# Patient Record
Sex: FEMALE | Race: BLACK OR AFRICAN AMERICAN | Employment: FULL TIME | URBAN - METROPOLITAN AREA
[De-identification: names, ages, dates, MRNs, and addresses within clinical notes are randomized per-mention and may not be internally consistent; named-entity substitution may affect disease eponyms.]

---

## 2017-09-15 ENCOUNTER — OFFICE VISIT (OUTPATIENT)
Dept: OPHTHALMOLOGY | Facility: CLINIC | Age: 47
End: 2017-09-15
Attending: OPHTHALMOLOGY
Payer: COMMERCIAL

## 2017-09-15 DIAGNOSIS — H50.30 INTERMITTENT EXOTROPIA, MONOCULAR: Primary | ICD-10-CM

## 2017-09-15 PROCEDURE — 92060 SENSORIMOTOR EXAMINATION: CPT | Mod: ZF | Performed by: OPHTHALMOLOGY

## 2017-09-15 PROCEDURE — 99213 OFFICE O/P EST LOW 20 MIN: CPT | Mod: 25,ZF

## 2017-09-15 ASSESSMENT — VISUAL ACUITY
METHOD: SNELLEN - LINEAR
OS_CC: 20/20
OD_CC: 20/20
OS_CC: J1+
OS_CC+: -
OD_CC: J1+
CORRECTION_TYPE: GLASSES

## 2017-09-15 ASSESSMENT — REFRACTION_WEARINGRX
OS_CYLINDER: +1.25
OD_SPHERE: -3.25
OD_CYLINDER: +1.00
OS_SPHERE: -4.25
SPECS_TYPE: SVL
OD_AXIS: 175
OD_ADD: +2.00
OS_ADD: +2.00
OS_AXIS: 015

## 2017-09-15 ASSESSMENT — REFRACTION
OS_CYLINDER: +1.00
OS_SPHERE: -4.00
OD_SPHERE: -3.50
OD_CYLINDER: +1.00
OS_AXIS: 010
OD_AXIS: 180

## 2017-09-15 ASSESSMENT — CONF VISUAL FIELD
OS_NORMAL: 1
METHOD: COUNTING FINGERS
OD_NORMAL: 1

## 2017-09-15 ASSESSMENT — EXTERNAL EXAM - RIGHT EYE: OD_EXAM: NORMAL

## 2017-09-15 ASSESSMENT — CUP TO DISC RATIO
OD_RATIO: 0.45
OS_RATIO: 0.5

## 2017-09-15 ASSESSMENT — SLIT LAMP EXAM - LIDS
COMMENTS: NORMAL
COMMENTS: NORMAL

## 2017-09-15 ASSESSMENT — EXTERNAL EXAM - LEFT EYE: OS_EXAM: NORMAL

## 2017-09-15 ASSESSMENT — TONOMETRY
OD_IOP_MMHG: 19
IOP_METHOD: ICARE - KS
OS_IOP_MMHG: 19

## 2017-09-15 NOTE — PROGRESS NOTES
"Chief Complaints and History of Present Illnesses   Patient presents with     Exotropia Evaluation     Referred by Dr. Lin in [place] (private ophtho clinic). RXT first noticed since 2nd grade, no trauma. First started wearing glasses at age 10, no VA concerns d/n, WGFT. No diplopia. No AHP. No prior treatment. Father has straismus - no sx.    Review of systems for the eyes was negative other than the pertinent positives and negatives noted in the HPI.  History is obtained from the patient    Referring provider: Cady Lin     Primary care: No primary care provider on file.   Assessment   Bailey Amin is a 47 year old female who presents with:       ICD-10-CM    1. Intermittent exotropia, monocular H50.30 Sensorimotor     Dixie-Operative Worksheet         Plan  I recommend eye muscle surgery. Today with Bailey, I reviewed the indications, risks, benefits, and alternatives of eye muscle surgery including, but not limited to, failure obtain the desired ocular alignment (\"over\" or \"under\" correction) and need for additional surgery. I further explained that surgery alone would not necessarily fully \"cure\" Bailey's strabismus or resolve/prevent the need for refractive corretion.  Rather, I emphasized that regular follow-up to monitor and optimize her vision would be necessary. We also discussed the risks of surgical injury, bleeding, and infection which may necessitate further medical or surgical treatment and which may result in diplopia, loss of vision, blindness, or loss of the eye(s) in less than 1% of cases and the remote possibility of permanent damage to any organ system or death with the use of general anesthesia.  I explained that we would hide visible scars as much as possible in natural creases but that every patient heals and pigments differently resulting in a variable degree of scarring to the eyes or surrounding facial structures after surgery.  I provided multiple opportunities for questions, " "answered all questions to the best of my ability, and confirmed that my answers and my discussion were understood.  Likely [facility]     Further details of the management plan can be found in the \"Patient Instructions\" section which was printed and given to the patient at checkout.  Data Unavailable   Attending Physician Attestation:  Complete documentation of historical and exam elements from today's encounter can be found in the full encounter summary report (not reduplicated in this progress note).  I personally obtained the chief complaint(s) and history of present illness.  I confirmed and edited as necessary the review of systems, past medical/surgical history, family history, social history, and examination findings as documented by others; and I examined the patient myself.  I personally reviewed the relevant tests, images, and reports as documented above.  I formulated and edited as necessary the assessment and plan and discussed the findings and management plan with the patient and family. - Marianna Narayan MD 9/15/2017 10:02 AM       "

## 2017-09-15 NOTE — MR AVS SNAPSHOT
After Visit Summary   9/15/2017    Bailey Amin    MRN: [id number]           Patient Information     Date Of Birth          1970        Visit Information        Provider Department      9/15/2017 7:30 AM Marianna Narayan MD [facility] Peds Eye General        Today's Diagnoses     Intermittent exotropia, monocular    -  1       Follow-ups after your visit        Your next 10 appointments already scheduled     2017 11:30 AM CST   Return Visit with [facility] EYE ORTHOPTICS   [facility] Peds Eye General ([facility])    [address]  [place] [address] [place] [id number] [phone]            2017 12:55 PM CST   Post-Op with Marianna Narayan MD   [facility] Peds Eye General ([facility])    [address]  [place] [address] [place] [id number] [phone]              Who to contact     Please call your clinic at [phone] to:    Ask questions about your health    Make or cancel appointments    Discuss your medicines    Learn about your test results    Speak to your doctor   If you have compliments or concerns about an experience at your clinic, or if you wish to file a complaint, please contact [facility] Patient Relations at [phone] or email us at [email or web address]         Additional Information About Your Visit        MyChart Information     Mak[employer] is an electronic gateway that provides easy, online access to your medical records. With Makani Power, you can request a clinic appointment, read your test results, renew a prescription or communicate with your care team.     To sign up for [employer] Med[employer] visit the website at [email or web address]   You will be asked to enter the access code listed below, as well as some personal information. Please follow the directions to create your username and password.     Your access code is: [email or web address]  Expires: 2017 10:58 AM     Your access code will  in  90 days. If you need help or a new code, please contact your [facility] or call [phone] for assistance.        Care EveryWhere ID     This is your Care EveryWhere ID. This could be used by other organizations to access your [facility] medical records  [id number]         Blood Pressure from Last 3 Encounters:   No data found for BP    Weight from Last 3 Encounters:   No data found for Wt              We Performed the Following     Dixie-Operative Worksheet     Sensorimotor        Primary Care Provider Office Phone # Fax #    Alexis De Luna [phone]       [address] [address]        Equal Access to Services     [facility] : Hadii aad ku hadasho Soomaali, waaxda luqadaha, qaybta kaalmada adeegyada, katrina arguelles . So [facility] [phone].    ATENCIÓN: Si habla español, tiene a [place] disposición servicios gratuitos de asistencia lingüística. Llame al [phone].    We comply with applicable federal civil rights laws and [place] laws. We do not discriminate on the basis of race, color, national origin, age, disability sex, sexual orientation or gender identity.            Thank you!     Thank you for choosing [facility] EYE GENERAL  for your care. Our goal is always to provide you with excellent care. Hearing back from our patients is one way we can continue to improve our services. Please take a few minutes to complete the written survey that you may receive in the mail after your visit with us. Thank you!             Your Updated Medication List - Protect others around you: Learn how to safely use, store and throw away your medicines at www.disposemymeds.org.      Notice  As of 9/15/2017 10:58 AM    You have not been prescribed any medications.

## 2017-09-15 NOTE — LETTER
"September 15, 2017    Cady Lin, OD  [facility]  [address]   [place] [address]       RE:  MRN:  : Bailey Amin  [id number]  1970     Dear Dr. Lin:    It was my pleasure to examine Bailey Amin on 9/15/2017 at the [facility] at the [facility]. Please find my assessment and recommendations below. I have also attached the findings from today's examination to the end of this note for your records.    Chief Complaints and History of Present Illnesses   Patient presents with     Exotropia Evaluation     Referred by Dr. Lin in [place] (private ophtho clinic). RXT first noticed since 2nd grade, no trauma. First started wearing glasses at age 10, no VA concerns d/n, WGFT. No diplopia. No AHP. No prior treatment. Father has strabismus - no sx.    Review of systems for the eyes was negative other than the pertinent positives and negatives noted in the HPI.  History is obtained from the patient    Referring provider: Cady Lin     Primary care: No primary care provider on file.   Assessment   Bailey Amin is a 47-year-old female who presents with:       ICD-10-CM    1. Intermittent exotropia, monocular H50.30 Sensorimotor     Dixie-Operative Worksheet         Plan  I recommend eye muscle surgery. Today with Bailey, I reviewed the indications, risks, benefits, and alternatives of eye muscle surgery including, but not limited to, failure obtain the desired ocular alignment (\"over\" or \"under\" correction) and need for additional surgery. I further explained that surgery alone would not necessarily fully \"cure\" Bailey's strabismus or resolve/prevent the need for refractive corretion.  Rather, I emphasized that regular follow-up to monitor and optimize her vision would be necessary. We also discussed the risks of surgical injury, bleeding, and infection which may necessitate further medical or surgical " "treatment and which may result in diplopia, loss of vision, blindness, or loss of the eye(s) in less than 1% of cases and the remote possibility of permanent damage to any organ system or death with the use of general anesthesia.  I explained that we would hide visible scars as much as possible in natural creases but that every patient heals and pigments differently resulting in a variable degree of scarring to the eyes or surrounding facial structures after surgery.  I provided multiple opportunities for questions, answered all questions to the best of my ability, and confirmed that my answers and my discussion were understood.  Likely [facility]     Further details of the management plan can be found in the \"Patient Instructions\" section which was printed and given to the patient at checkout.    Attending Physician Attestation:  Complete documentation of historical and exam elements from today's encounter can be found in the full encounter summary report (not reduplicated in this progress note).  I personally obtained the chief complaint(s) and history of present illness.  I confirmed and edited as necessary the review of systems, past medical/surgical history, family history, social history, and examination findings as documented by others; and I examined the patient myself.  I personally reviewed the relevant tests, images, and reports as documented above.  I formulated and edited as necessary the assessment and plan and discussed the findings and management plan with the patient and family. - Marianna Narayan MD 9/15/2017 10:02 AM         Thank you for the opportunity to participate in Bailey Amin's care. If you would like to discuss anything further, please do not hesitate to contact me.     Sincerely,    Marianna Narayan MD      GRISELDA SPARROW  [address] [address]  VIA Facsimile: [phone]     Bailey Amin  [address] [place] " [address]  VIA Mail       Base Eye Exam     Visual Acuity (Snellen - Linear)      Right Left   Dist cc 20/20 20/20 -   Near cc J1+ J1+       Correction:  Glasses      Tonometry (icare - ks, 8:20 AM)      Right Left   Pressure 19 19         Pupils      Pupils APD   Right PERRL None   Left PERRL None       Hazy irises.      Visual Fields (Counting fingers)      Left Right   Result Full Full         Neuro/Psych     Oriented x3:  Yes    Mood/Affect:  Normal      Dilation     Both eyes:  1.0% Mydriacyl, 2.5% New Synephrine @ 8:32 AM            Additional Tests     Betsey     Betsey:  Equal      Stereo     Fly:  +    Circles:  80    Fusion for short span, then suppressed RE.      [place] 4 Dot     Distance:  Fusion    Near:  Fusion            Strabismus Exam       Reading #1   (Edited by: Kaye Richardson)    Method:  Alternate cover Distance Near Near +3.00DS Near Bifocals     Correction:  cc   X(T)' 50                       0 0 -2  X(T) >50 -2 0 0    R Tilt                         XT >50 0  - -  X(T) >50 0  -2  XT >50                     L Tilt       0 0 0  X(T) >50 0 0 0            DVD:      DVD:           Nystagmus:  None       AHP:  right head tilt                Reading #2   (Edited by: Mery Grier Co)    Method:  Alternate cover km Distance Near Near +3.00DS Near Bifocals     Correction:  cc   RX(T)' 65                       0 0 +2  X(T) 55 +2 0 0    R Tilt                         XT 50 0  -tr  RX(T) 50 -tr  -2  XT 50                     L Tilt       0 0 +3  X(T) 55-60 +3 0 0            DVD:      DVD:           Nystagmus:  None       AHP:  right head tilt, I see slight chin up                 Comments     Very poor control d/n, loses with one cover.  [place] for me at dist (2nd exam), breaks easily at near, recovers prior to blink.   No diplopia noted d/n with up to 50 BI RE - suppresses            Slit Lamp and Fundus Exam     External Exam      Right Left    External Normal Normal      Slit Lamp Exam       Right Left    Lids/Lashes Normal Normal    Conjunctiva/Sclera White and quiet White and quiet    Cornea Clear Clear    Anterior Chamber Deep and quiet Deep and quiet    Iris Round and reactive Round and reactive    Lens Clear Clear    Vitreous Normal Normal      Fundus Exam      Right Left    Disc Normal Normal    C/D Ratio 0.45 0.5    Macula Normal Normal    Vessels Normal Normal    Periphery Normal Normal            Refraction     Wearing Rx      Sphere Cylinder Axis Add   Right -3.25 +1.00 175 +2.00   Left -4.25 +1.25 015 +2.00       Type:  SVL      Cycloplegic Refraction      Sphere Cylinder Axis Dist   Right -3.50 +1.00 180 20/20   Left -4.00 +1.00 010 20/20

## 2017-09-15 NOTE — NURSING NOTE
Chief Complaint   Patient presents with     Exotropia Evaluation     Referred by Dr. Lin in [place] (private ophtho clinic). RXT first noticed since 2nd grade, no trauma. First started wearing glasses at age 10, no VA concerns d/n, WGFT. No diplopia. No AHP. No prior treatment. Father has straismus - no sx.      HPI    Symptoms:           Do you have eye pain now?:  No

## 2017-09-21 PROBLEM — H50.30 INTERMITTENT EXOTROPIA, MONOCULAR: Status: ACTIVE | Noted: 2017-09-21

## 2017-11-12 ENCOUNTER — ANESTHESIA EVENT (OUTPATIENT)
Dept: SURGERY | Facility: CLINIC | Age: 47
End: 2017-11-12
Payer: COMMERCIAL

## 2017-11-14 ASSESSMENT — ENCOUNTER SYMPTOMS: SEIZURES: 0

## 2017-11-15 ENCOUNTER — ANESTHESIA (OUTPATIENT)
Dept: SURGERY | Facility: CLINIC | Age: 47
End: 2017-11-15
Payer: COMMERCIAL

## 2017-11-15 ENCOUNTER — HOSPITAL ENCOUNTER (OUTPATIENT)
Facility: CLINIC | Age: 47
Discharge: HOME | End: 2017-11-15
Attending: OPHTHALMOLOGY | Admitting: OPHTHALMOLOGY
Payer: COMMERCIAL

## 2017-11-15 ENCOUNTER — OFFICE VISIT (OUTPATIENT)
Dept: OPHTHALMOLOGY | Facility: CLINIC | Age: 47
End: 2017-11-15
Attending: OPHTHALMOLOGY
Payer: COMMERCIAL

## 2017-11-15 VITALS
HEIGHT: 65 IN | OXYGEN SATURATION: 99 % | DIASTOLIC BLOOD PRESSURE: 65 MMHG | HEART RATE: 79 BPM | WEIGHT: 120.37 LBS | TEMPERATURE: 98.4 F | BODY MASS INDEX: 20.06 KG/M2 | SYSTOLIC BLOOD PRESSURE: 95 MMHG | RESPIRATION RATE: 20 BRPM

## 2017-11-15 DIAGNOSIS — Z98.890 POSTOPERATIVE EYE STATE: Primary | ICD-10-CM

## 2017-11-15 DIAGNOSIS — H50.30 INTERMITTENT EXOTROPIA, MONOCULAR: Primary | ICD-10-CM

## 2017-11-15 LAB
GLUCOSE BLDC GLUCOMTR-MCNC: 75 MG/DL (ref 70–99)
HCG UR QL: NEGATIVE

## 2017-11-15 PROCEDURE — 36000059 ZZH SURGERY LEVEL 3 EA 15 ADDTL MIN UMMC: Performed by: OPHTHALMOLOGY

## 2017-11-15 PROCEDURE — 37000009 ZZH ANESTHESIA TECHNICAL FEE, EACH ADDTL 15 MIN: Performed by: OPHTHALMOLOGY

## 2017-11-15 PROCEDURE — 36000057 ZZH SURGERY LEVEL 3 1ST 30 MIN - UMMC: Performed by: OPHTHALMOLOGY

## 2017-11-15 PROCEDURE — 25000125 ZZHC RX 250: Performed by: ANESTHESIOLOGY

## 2017-11-15 PROCEDURE — 25000125 ZZHC RX 250: Performed by: OPHTHALMOLOGY

## 2017-11-15 PROCEDURE — 25000566 ZZH SEVOFLURANE, EA 15 MIN: Performed by: OPHTHALMOLOGY

## 2017-11-15 PROCEDURE — 25000132 ZZH RX MED GY IP 250 OP 250 PS 637: Performed by: ANESTHESIOLOGY

## 2017-11-15 PROCEDURE — 25000128 H RX IP 250 OP 636: Performed by: ANESTHESIOLOGY

## 2017-11-15 PROCEDURE — 82962 GLUCOSE BLOOD TEST: CPT

## 2017-11-15 PROCEDURE — 71000012 ZZH RECOVERY PHASE 1 LEVEL 1 FIRST HR: Performed by: OPHTHALMOLOGY

## 2017-11-15 PROCEDURE — 71000027 ZZH RECOVERY PHASE 2 EACH 15 MINS: Performed by: OPHTHALMOLOGY

## 2017-11-15 PROCEDURE — 81025 URINE PREGNANCY TEST: CPT | Performed by: ANESTHESIOLOGY

## 2017-11-15 PROCEDURE — 92060 SENSORIMOTOR EXAMINATION: CPT | Mod: ZF

## 2017-11-15 PROCEDURE — 25000128 H RX IP 250 OP 636: Performed by: NURSE ANESTHETIST, CERTIFIED REGISTERED

## 2017-11-15 PROCEDURE — 99213 OFFICE O/P EST LOW 20 MIN: CPT | Mod: 25,ZF

## 2017-11-15 PROCEDURE — 25000125 ZZHC RX 250: Performed by: NURSE ANESTHETIST, CERTIFIED REGISTERED

## 2017-11-15 PROCEDURE — 25000132 ZZH RX MED GY IP 250 OP 250 PS 637: Performed by: OPHTHALMOLOGY

## 2017-11-15 PROCEDURE — 37000008 ZZH ANESTHESIA TECHNICAL FEE, 1ST 30 MIN: Performed by: OPHTHALMOLOGY

## 2017-11-15 PROCEDURE — 40000170 ZZH STATISTIC PRE-PROCEDURE ASSESSMENT II: Performed by: OPHTHALMOLOGY

## 2017-11-15 PROCEDURE — 27210794 ZZH OR GENERAL SUPPLY STERILE: Performed by: OPHTHALMOLOGY

## 2017-11-15 PROCEDURE — 71000013 ZZH RECOVERY PHASE 1 LEVEL 1 EA ADDTL HR: Performed by: OPHTHALMOLOGY

## 2017-11-15 RX ORDER — KETOROLAC TROMETHAMINE 30 MG/ML
INJECTION, SOLUTION INTRAMUSCULAR; INTRAVENOUS PRN
Status: DISCONTINUED | OUTPATIENT
Start: 2017-11-15 | End: 2017-11-15

## 2017-11-15 RX ORDER — ONDANSETRON 4 MG/1
4 TABLET, ORALLY DISINTEGRATING ORAL EVERY 30 MIN PRN
Status: DISCONTINUED | OUTPATIENT
Start: 2017-11-15 | End: 2017-11-15 | Stop reason: HOSPADM

## 2017-11-15 RX ORDER — SODIUM CHLORIDE, SODIUM LACTATE, POTASSIUM CHLORIDE, CALCIUM CHLORIDE 600; 310; 30; 20 MG/100ML; MG/100ML; MG/100ML; MG/100ML
500 INJECTION, SOLUTION INTRAVENOUS CONTINUOUS
Status: DISCONTINUED | OUTPATIENT
Start: 2017-11-15 | End: 2017-11-15 | Stop reason: HOSPADM

## 2017-11-15 RX ORDER — ONDANSETRON 2 MG/ML
INJECTION INTRAMUSCULAR; INTRAVENOUS PRN
Status: DISCONTINUED | OUTPATIENT
Start: 2017-11-15 | End: 2017-11-15

## 2017-11-15 RX ORDER — OXYCODONE HYDROCHLORIDE 5 MG/1
5 TABLET ORAL
Status: COMPLETED | OUTPATIENT
Start: 2017-11-15 | End: 2017-11-15

## 2017-11-15 RX ORDER — OXYMETAZOLINE HYDROCHLORIDE 0.05 G/100ML
SPRAY NASAL PRN
Status: DISCONTINUED | OUTPATIENT
Start: 2017-11-15 | End: 2017-11-15 | Stop reason: HOSPADM

## 2017-11-15 RX ORDER — SODIUM CHLORIDE, SODIUM LACTATE, POTASSIUM CHLORIDE, CALCIUM CHLORIDE 600; 310; 30; 20 MG/100ML; MG/100ML; MG/100ML; MG/100ML
INJECTION, SOLUTION INTRAVENOUS CONTINUOUS
Status: DISCONTINUED | OUTPATIENT
Start: 2017-11-15 | End: 2017-11-15 | Stop reason: HOSPADM

## 2017-11-15 RX ORDER — SCOLOPAMINE TRANSDERMAL SYSTEM 1 MG/1
1 PATCH, EXTENDED RELEASE TRANSDERMAL ONCE
Status: COMPLETED | OUTPATIENT
Start: 2017-11-15 | End: 2017-11-15

## 2017-11-15 RX ORDER — MEPERIDINE HYDROCHLORIDE 25 MG/ML
12.5 INJECTION INTRAMUSCULAR; INTRAVENOUS; SUBCUTANEOUS
Status: DISCONTINUED | OUTPATIENT
Start: 2017-11-15 | End: 2017-11-15 | Stop reason: HOSPADM

## 2017-11-15 RX ORDER — BALANCED SALT SOLUTION 6.4; .75; .48; .3; 3.9; 1.7 MG/ML; MG/ML; MG/ML; MG/ML; MG/ML; MG/ML
SOLUTION OPHTHALMIC PRN
Status: DISCONTINUED | OUTPATIENT
Start: 2017-11-15 | End: 2017-11-15 | Stop reason: HOSPADM

## 2017-11-15 RX ORDER — NALOXONE HYDROCHLORIDE 0.4 MG/ML
.1-.4 INJECTION, SOLUTION INTRAMUSCULAR; INTRAVENOUS; SUBCUTANEOUS
Status: DISCONTINUED | OUTPATIENT
Start: 2017-11-15 | End: 2017-11-15 | Stop reason: HOSPADM

## 2017-11-15 RX ORDER — GLYCOPYRROLATE 0.2 MG/ML
INJECTION, SOLUTION INTRAMUSCULAR; INTRAVENOUS PRN
Status: DISCONTINUED | OUTPATIENT
Start: 2017-11-15 | End: 2017-11-15

## 2017-11-15 RX ORDER — ONDANSETRON 2 MG/ML
4 INJECTION INTRAMUSCULAR; INTRAVENOUS EVERY 30 MIN PRN
Status: DISCONTINUED | OUTPATIENT
Start: 2017-11-15 | End: 2017-11-15 | Stop reason: HOSPADM

## 2017-11-15 RX ORDER — FENTANYL CITRATE 50 UG/ML
25-50 INJECTION, SOLUTION INTRAMUSCULAR; INTRAVENOUS
Status: DISCONTINUED | OUTPATIENT
Start: 2017-11-15 | End: 2017-11-15 | Stop reason: HOSPADM

## 2017-11-15 RX ORDER — LIDOCAINE HYDROCHLORIDE 20 MG/ML
INJECTION, SOLUTION INFILTRATION; PERINEURAL PRN
Status: DISCONTINUED | OUTPATIENT
Start: 2017-11-15 | End: 2017-11-15

## 2017-11-15 RX ORDER — HYDROMORPHONE HYDROCHLORIDE 1 MG/ML
.3-.5 INJECTION, SOLUTION INTRAMUSCULAR; INTRAVENOUS; SUBCUTANEOUS EVERY 10 MIN PRN
Status: DISCONTINUED | OUTPATIENT
Start: 2017-11-15 | End: 2017-11-15 | Stop reason: HOSPADM

## 2017-11-15 RX ORDER — SODIUM CHLORIDE, SODIUM LACTATE, POTASSIUM CHLORIDE, CALCIUM CHLORIDE 600; 310; 30; 20 MG/100ML; MG/100ML; MG/100ML; MG/100ML
INJECTION, SOLUTION INTRAVENOUS CONTINUOUS PRN
Status: DISCONTINUED | OUTPATIENT
Start: 2017-11-15 | End: 2017-11-15

## 2017-11-15 RX ORDER — ACETAMINOPHEN 500 MG
1000 TABLET ORAL ONCE
Status: COMPLETED | OUTPATIENT
Start: 2017-11-15 | End: 2017-11-15

## 2017-11-15 RX ORDER — PROPOFOL 10 MG/ML
INJECTION, EMULSION INTRAVENOUS PRN
Status: DISCONTINUED | OUTPATIENT
Start: 2017-11-15 | End: 2017-11-15

## 2017-11-15 RX ORDER — OXYCODONE HYDROCHLORIDE 5 MG/1
5 TABLET ORAL EVERY 6 HOURS PRN
Qty: 12 TABLET | Refills: 0 | Status: SHIPPED | OUTPATIENT
Start: 2017-11-15 | End: 2018-04-20

## 2017-11-15 RX ORDER — FENTANYL CITRATE 50 UG/ML
INJECTION, SOLUTION INTRAMUSCULAR; INTRAVENOUS PRN
Status: DISCONTINUED | OUTPATIENT
Start: 2017-11-15 | End: 2017-11-15

## 2017-11-15 RX ORDER — PHENYLEPHRINE HCL IN 0.9% NACL 1 MG/10 ML
SYRINGE (ML) INTRAVENOUS PRN
Status: DISCONTINUED | OUTPATIENT
Start: 2017-11-15 | End: 2017-11-15

## 2017-11-15 RX ADMIN — FENTANYL CITRATE 50 MCG: 50 INJECTION, SOLUTION INTRAMUSCULAR; INTRAVENOUS at 14:07

## 2017-11-15 RX ADMIN — ONDANSETRON 4 MG: 2 INJECTION INTRAMUSCULAR; INTRAVENOUS at 15:18

## 2017-11-15 RX ADMIN — KETOROLAC TROMETHAMINE 30 MG: 30 INJECTION, SOLUTION INTRAMUSCULAR at 15:19

## 2017-11-15 RX ADMIN — SCOLOPAMINE TRANSDERMAL SYSTEM 1 PATCH: 1 PATCH, EXTENDED RELEASE TRANSDERMAL at 13:30

## 2017-11-15 RX ADMIN — SODIUM CHLORIDE, POTASSIUM CHLORIDE, SODIUM LACTATE AND CALCIUM CHLORIDE: 600; 310; 30; 20 INJECTION, SOLUTION INTRAVENOUS at 13:58

## 2017-11-15 RX ADMIN — HYDROMORPHONE HYDROCHLORIDE 0.3 MG: 1 INJECTION, SOLUTION INTRAMUSCULAR; INTRAVENOUS; SUBCUTANEOUS at 16:11

## 2017-11-15 RX ADMIN — KETOROLAC TROMETHAMINE 30 MG: 30 INJECTION, SOLUTION INTRAMUSCULAR at 15:20

## 2017-11-15 RX ADMIN — ACETAMINOPHEN 1000 MG: 500 TABLET ORAL at 13:30

## 2017-11-15 RX ADMIN — HYDROMORPHONE HYDROCHLORIDE 0.2 MG: 1 INJECTION, SOLUTION INTRAMUSCULAR; INTRAVENOUS; SUBCUTANEOUS at 16:01

## 2017-11-15 RX ADMIN — FENTANYL CITRATE 25 MCG: 50 INJECTION, SOLUTION INTRAMUSCULAR; INTRAVENOUS at 14:47

## 2017-11-15 RX ADMIN — LIDOCAINE HYDROCHLORIDE 50 MG: 20 INJECTION, SOLUTION INFILTRATION; PERINEURAL at 14:07

## 2017-11-15 RX ADMIN — FENTANYL CITRATE 25 MCG: 50 INJECTION, SOLUTION INTRAMUSCULAR; INTRAVENOUS at 14:55

## 2017-11-15 RX ADMIN — OXYCODONE HYDROCHLORIDE 5 MG: 5 TABLET ORAL at 16:37

## 2017-11-15 RX ADMIN — FENTANYL CITRATE 50 MCG: 50 INJECTION, SOLUTION INTRAMUSCULAR; INTRAVENOUS at 15:27

## 2017-11-15 RX ADMIN — Medication 0.2 MG: at 14:23

## 2017-11-15 RX ADMIN — Medication 50 MCG: at 15:01

## 2017-11-15 RX ADMIN — Medication 100 MCG: at 14:21

## 2017-11-15 RX ADMIN — PROPOFOL 150 MG: 10 INJECTION, EMULSION INTRAVENOUS at 14:07

## 2017-11-15 ASSESSMENT — REFRACTION_WEARINGRX
OS_CYLINDER: +1.25
OS_SPHERE: -4.25
OS_ADD: +2.00
OD_ADD: +2.00
OS_AXIS: 015
OD_AXIS: 175
OD_CYLINDER: +1.00
SPECS_TYPE: SVL
OD_SPHERE: -3.25

## 2017-11-15 ASSESSMENT — ENCOUNTER SYMPTOMS: STRIDOR: 0

## 2017-11-15 ASSESSMENT — VISUAL ACUITY
OD_CC: 20/20
OS_CC: 20/20
METHOD: SNELLEN - LINEAR

## 2017-11-15 NOTE — OR NURSING
Instructions reviewed, with  Pt and pt's mother  Pt  has taken 1 oxycodone recently  for eye discomfort  Is tolerating food and liquids without nausea.  Dr Del Castillo notified pt is doing well, anesthesia sign out requested.

## 2017-11-15 NOTE — NURSING NOTE
Chief Complaint   Patient presents with     Exotropia Follow Up     RSE. XT stable, vision is good.      HPI    Affected eye(s):  Right   Symptoms:     Wandering eye      Frequency:  Intermittent       Do you have eye pain now?:  No

## 2017-11-15 NOTE — MR AVS SNAPSHOT
After Visit Summary   11/15/2017    Bailey Amin    MRN: [id number]           Patient Information     Date Of Birth          1970        Visit Information        Provider Department      11/15/2017 11:45 AM [facility] EYE ORTHOPTICS [facility] Peds Eye General        Today's Diagnoses     Intermittent exotropia, monocular    -  1       Follow-ups after your visit        Your next 10 appointments already scheduled     2018  3:00 PM CST   Return Pediatric Visit with Marianna Narayan MD   [facility] Peds Eye General ([facility])    [address]  [address] [id number] [phone]              Who to contact     Please call your clinic at [phone] to:    Ask questions about your health    Make or cancel appointments    Discuss your medicines    Learn about your test results    Speak to your doctor   If you have compliments or concerns about an experience at your clinic, or if you wish to file a complaint, please contact [facility] Patient Relations at [phone] or email us at [email or web address]         Additional Information About Your Visit        MyChart Information     BigDeal is an electronic gateway that provides easy, online access to your medical records. With BigDeal, you can request a clinic appointment, read your test results, renew a prescription or communicate with your care team.     To sign up for BigDeal visit the website at [email or web address]   You will be asked to enter the access code listed below, as well as some personal information. Please follow the directions to create your username and password.     Your access code is: [email or web address]  Expires: 2017  9:58 AM     Your access code will  in 90 days. If you need help or a new code, please contact your [facility] or call [phone] for assistance.        Care EveryWhere ID     This is your Care  EveryWhere ID. This could be used by other organizations to access your [facility] medical records  [id number]         Blood Pressure from Last 3 Encounters:   11/15/17 95/65    Weight from Last 3 Encounters:   11/15/17 54.6 kg (120 lb 5.9 oz)              We Performed the Following     Sensorimotor        Primary Care Provider Office Phone # Fax #    Alexis De Luna [phone]       [place] [address]        Equal Access to Services     SAHARA CLARK : Hadii aad ku hadasho Soomaali, waaxda luqadaha, qaybta kaalmada adeegyada, waxay idiin hayaan adeeg khpam lapina . So [facility] [phone].    ATENCIÓN: Si habla español, tiene a [place] disposición servicios gratuitos de asistencia lingüística. [facility] [phone].    We comply with applicable federal civil rights laws and [place] laws. We do not discriminate on the basis of race, color, national origin, age, disability, sex, sexual orientation, or gender identity.            Thank you!     Thank you for choosing [facility] EYE GENERAL  for your care. Our goal is always to provide you with excellent care. Hearing back from our patients is one way we can continue to improve our services. Please take a few minutes to complete the written survey that you may receive in the mail after your visit with us. Thank you!             Your Updated Medication List - Protect others around you: Learn how to safely use, store and throw away your medicines at www.disposemymeds.org.          This list is accurate as of: 11/15/17 12:08 PM.  Always use your most recent med list.                   Brand Name Dispense Instructions for use Diagnosis    HYDROXYZINE PAMOATE PO      Take 25 mg by mouth nightly as needed        oxyCODONE IR 5 MG tablet    ROXICODONE    12 tablet    Take 1 tablet (5 mg) by mouth every 6 hours as needed for pain Maximum 4 tablet(s) per day    Postoperative eye state

## 2017-11-15 NOTE — ANESTHESIA CARE TRANSFER NOTE
Patient: Bailey Amin    Procedure(s):  Bilateral Strabismus Repair - Wound Class: I-Clean    Diagnosis: Strabismus  Diagnosis Additional Information: No value filed.    Anesthesia Type:   General, LMA     Note:  Airway :Face Mask  Patient transferred to:PACU  Comments: Patient is easily arousable, encouraged to keep eyes closed. VSS, exchanging O2 per FM, in no distress; normothermic. IV is patent. Report to RN.Handoff Report: Identifed the Patient, Identified the Reponsible Provider, Reviewed the pertinent medical history, Discussed the surgical course, Reviewed Intra-OP anesthesia mangement and issues during anesthesia, Set expectations for post-procedure period and Allowed opportunity for questions and acknowledgement of understanding      Vitals: (Last set prior to Anesthesia Care Transfer)    CRNA VITALS  11/15/2017 1459 - 11/15/2017 1543      11/15/2017             NIBP: 105/72    Pulse: 92    Temp: 36.6  C (97.9  F)    SpO2: 100 %    Resp Rate (observed): 9    EKG: Sinus rhythm                Electronically Signed By: DASHA Ny CRNA  November 15, 2017  3:43 PM

## 2017-11-15 NOTE — IP AVS SNAPSHOT
MRN:[id number]                      After Visit Summary   11/15/2017    Bailey Amin    MRN: [id number]           Thank you!     Thank you for choosing [facility] for your care. Our goal is always to provide you with excellent care. Hearing back from our patients is one way we can continue to improve our services. Please take a few minutes to complete the written survey that you may receive in the mail after you visit with us. Thank you!        Patient Information     Date Of Birth          1970        About your hospital stay     You were admitted on:  November 15, 2017 You last received care in the:  [facility] PACU    You were discharged on:  November 15, 2017       Who to Call     For medical emergencies, please call 911.  For non-urgent questions about your medical care, please call your primary care provider or clinic, [phone]  For questions related to your surgery, please call your surgery clinic        Attending Provider     Provider Marianna Harrison MD Ophthalmology       Primary Care Provider Office Phone # Fax #    Alexis De Luna [phone]      Your next 10 appointments already scheduled     Nov 17, 2017 11:00 AM CST   Post-Op with Marianna Narayan MD   [facility] ([facility])    [address] [address] [phone]              Further instructions from your care team       Instructions for after your eye surgery:  Apply ice packs to eyes on and off as tolerated for 2 days.    Oxycodone 5mg has been prescribed. You do not need to fill this prescription but it is provided for breakthrough pain. Take 1 pill every six hours as needed for pain.     Acetaminophen (Tylenol) and NSAIDs (Motrin, Ibuprofen, Advil, Naproxen) may be given per the dosing instructions on the label for pain every 6 hours.  I recommend alternating these two types of medicine every 3 hours so that Bailey  receives one of them for pain control every 3 hours.  (For example: acetaminophen - wait 3 hours - ibuprofen - wait 3 hours - acetaminophen - wait 3 hours - ibuprofen - etc.)    Avoid all eye pressure or trauma. No eye rubbing, straining, or athletics for 1 week.     No water in the face (including bathing) for 1 week. Instill your antibiotic eye drops after bathing for the first week. No swimming for 2 weeks.    Same-Day Surgery   Adult Discharge Orders & Instructions     For 24 hours after surgery:  1. Get plenty of rest.  A responsible adult must stay with you for at least 24 hours after you leave the hospital.   2. Pain medication can slow your reflexes. Do not drive or use heavy equipment.  If you have weakness or tingling, don't drive or use heavy equipment until this feeling goes away.  3. Mixing alcohol and pain medication can cause dizziness and slow your breathing. It can even be fatal. Do not drink alcohol while taking pain medication.  4. Avoid strenuous or risky activities.  Ask for help when climbing stairs.   5. You may feel lightheaded.  If so, sit for a few minutes before standing.  Have someone help you get up.   6. If you have nausea (feel sick to your stomach), drink only clear liquids such as apple juice, ginger ale, broth or 7-Up.  Rest may also help.  Be sure to drink enough fluids.  Move to a regular diet as you feel able. Take pain medications with a small amount of solid food, such as toast or crackers, to avoid nausea.   7. A slight fever is normal. Call the doctor if your fever is over 100 F (37.7 C) (taken under the tongue) or lasts longer than 24 hours.  8. You may have a dry mouth, muscle aches, trouble sleeping or a sore throat.  These symptoms should go away after 24 hours.  9. Do not make important or legal decisions.   Pain Management:      1. Take pain medication (if prescribed) for pain as directed by your physician.        2. WARNING: If the pain medication you have been  prescribed contains Tylenol  (acetaminophen), DO NOT take additional doses of Tylenol (acetaminophen).     Call your doctor for any of the followin.  Signs of infection (fever, growing tenderness at the surgery site, severe pain, a large amount of drainage or bleeding, foul-smelling drainage, redness, swelling).    2.  It has been over 8 to 10 hours since surgery and you are still not able to urinate (pee).    3.  Headache for over 24 hours.    4.  Numbness, tingling or weakness the day after surgery (if you had spinal anesthesia).  To contact a doctor, call _____________________________________ or:      [phone] and ask for the Resident On Call for:          __________________________________________ (answered 24 hours a day)      Emergency Department:  [facility]: [phone]  [facility]: [phone]               Rev. 10/2014   Scopolamine Patch  (Absorbed through the skin)    The Scopolamine Patch prevents nausea and vomiting caused by motion sickness or anesthesia and surgery in adults.    Brand Name(s): Transderm Scop, Transderm-Scope  There may be other brand names for this medicine.    When This Medicine Should Not Be Used:  You should not use this medicine if you have had an allergic reaction to scopolamine, or if you have narrow angle glaucoma.    How to Use This Medicine:    Your doctor will tell you how many patches to use, where to apply them, and how often to apply them.     Do not use more patches or apply them more often than your doctor tells you to.    This medicine comes with patient instructions. Read and follow these instructions carefully. Ask your doctor or pharmacist if you have any questions.    To prevent motion sickness, apply the patch at least 4 hours before you need it.    Wash and dry your hands thoroughly before applying the patch.    Leave the patch in its sealed wrapper until you are ready to put it on. Tear the wrapper open  carefully. NEVER CUT the wrapper or the patch with scissors. Do not use any patch that has been cut by accident.    Take the liner off the sticky side before applying.    Apply the patch to dry, hairless skin behind the ear.    If the patch is loose or falls off, apply a new patch at a different place behind the ear.    After you take off the patch, wash the place where the patch was and your hands thoroughly.    Only one patch should be used at any time.    If a dose is missed:  If you forget to wear or change a patch, put one on as soon as you can. If it is almost time to put on your next patch, wait until then to apply a new patch and skip the one you missed. Do not apply extra patches to make up for a missed dose.    How to Store and Dispose of This Medicine:    Store the patches at room temperature in a closed container, away from heat, moisture and direct light.    Fold the used patch in half with the sticky sides together. Throw any used patch away so that children or pets cannot get to it. You will also need to throw away old patches after the expiration date has passed.    Keep all medicine away from children and never share your medicine with anyone.    Ask your doctor or pharmacist before using any other medicine, including over-the-counter medicines, vitamins, and herbal products.  Tell your doctor if you are using any medicines that make you sleepy. These include sleeping pills, cold and allergy medicine, narcotic pain relievers and sedatives.     Tell your doctor if you are using any medicine that make you sleepy. These include sleeping pills, eliel and allergy medicine, narcotic pain relievers and sedatives.    Do not drink alcohol while you are using this medicine.     Warnings While Using This Medicine:    Make sure your doctor knows if you are pregnant or breastfeeding or if you have glaucoma, prostate problems, trouble urinating, blocked bowels, liver disease, kidney disease or a history of  seizures or mental illness.    This medicine can cause blurring of vision and other vision problems if it comes in contact with the eyes. This medicine may also cause problems with urination. If any of these reactions occur, remove the patch and call your doctor right away.    This medicine may make you dizzy or drowsy. Avoid driving, using machines, or doing anything else that could be dangerous if you are not alert. If you plan to participate in underwater sports, this medicine may cause disorienting effects. If this is a concern for you, talk with your doctor.    This medicine may make you sweat less and cause your body to get too hot. Be careful in hot weather, when you are exercising or if using sauna or whirlpool.    Make sure any doctor or dentist who treats you knows that you are using this medicine. This medicine may affect the results of certain medical tests.    Skin burns have been reported at the patch site in several patients wearing an aluminized transdermal system during a magnetic resonance imaging scan (MRI). Because Transderm Scop contains aluminum, it is recommended to remove the system before undergoing an MRI.    Call your doctor right away if you notice any of these side effects:    Allergic reaction: Itching or hives, swelling in your face or hands, swelling or tingling in your mouth or throat, chest tightness, trouble breathing    Blurred vision    Confusion or memory loss    Fast, slow or uneven heartbeat    Lightheadedness, dizziness, drowsiness or fainting    Seeing, hearing or feeling things that are not there    Severe eye pain    Trouble urinating    If you notice these less serious side effects, talk with your doctor:    Dry mouth    Dry, itchy or red eyes    Restlessness    Skin rash or redness    If you notice other side effects that you think are caused by this medicine, tell your doctor immediately.    Rev. 4/2014    Strabismus Repair Discharge Instructions    Dr. Marianna Narayan,  Dr. Uziel Love      Your eye doctor performed surgery to help align your eye(s). During surgery, certain eye muscles are adjusted. This helps the muscles better control how the eye moves. Often, surgery is done in addition to other treatments.   How Surgery Works  Strabismus surgery is a safe, common operation. The doctor changes the placement or length of an eye muscle. This small change can pull the eye into proper alignment. The two most common methods of surgery are:    Recession, in which a muscle is moved to a new position on the eye.    Resection, in which a small section of an eye muscle is removed.  What to Expect  It is normal to experience the following:    Eye Redness. This will resolve slowly over 2- 4 weeks.    Pink tinged tears    Swollen, painful or itchy eyes    Sensitivity to bright lights.    Trouble opening eyes for 1-2 days.    Feeling dizzy or off balance until you get used to seeing differently.  After Surgery Care    Avoid rubbing your eyes.  o You may use cool cloths to help with pain and/or itching.    Minimize direct contact with water for 1 week. Showering or bathing is allowed.  o You may use a warm, wet washcloth to remove any dried drainage from the eye. Wipe eye from inner corner to the outer corner of the eye.     No swimming for 1 week.    Use sunglasses or a hat to protect eyes from bright lights if you are light sensitive.    You may wear glasses as soon as needed.    No heavy lifting or contact sports for 1 week.     Use eye drops or eye ointment as directed to prevent infection and decrease swelling. Avoid touching surface of eye with the tube or bottle.   Suture Care  Sutures will dissolve over several weeks; they will not need to be removed.   Adjustable sutures may have been placed during surgery. You may need to stay in the recovery room for a longer period after surgery before a possible suture adjustment is performed. Once the suture is adjusted you will be sent home.    When to Call the Doctor     Eyelids are progressively getting more swollen and painful after 24 hours.    You see a dramatic change in the position of the eye over time.    Frequent or continuous vomiting.    Green or yellow drainage from the eye.    Temperature over 101 degrees.  If your child experiences worsening RSVP (Redness, Sensitivity to light, Vision, Pain), or develops fever or worsening discharge, call EITHER    [phone] (8am-4:30pm Monday-Friday)    [phone] (after hours & weekends)  and ask to speak with the Ophthalmology Resident or Fellow On-Call or return to the eye clinic or emergency room immediately.        If your child is unable to tolerate food and drink, vomits 3 times, or appears to have decreased alertness or lethargy, return to the emergency room immediately. These can be signs of delayed gastrointestinal wake-up after anesthesia and your child may need IV fluids to prevent dehydration.    Follow Up  Follow up with your doctor tomorrow as scheduled  Rev. 3/2014            Return for follow-up with Dr. Narayan as scheduled.  If you do not have an appointment already, please call to arrange follow-up     [place]: Leeann Goodman at [phone] or our  at [phone]        If Bailey Amin experiences worsening RSVP (Redness, Sensitivity to light, Vision, Pain), or if Bailey develops a fever (temperature greater than 100.4 F) or worsening discharge or if you have any other concerns:        call [phone] (during business hours) or [phone] (after hours & weekends) and ask to speak with the Ophthalmology Resident or Fellow On-Call   OR    return to the eye clinic or emergency room immediately.     If Bailey is unable to tolerate food and drink, vomits 3 times, or appears to have decreased alertness or lethargy, return to the emergency room immediately as these can be signs of delayed stomach wake-up after anesthesia and Bailey may need IV fluids to  "prevent dehydration.    For assistance from an :    7 AM - 6 PM on Monday - Friday, and 7 AM - 4:30 PM on Saturday & : call [phone], then select option 3.    After hours: call [phone] and ask the  for  assistance.       Pending Results     No orders found from 2017 to 2017.            Admission Information     Date & Time Provider Department Dept. Phone    11/15/2017 Marianna Narayan MD [facility] PACU [phone]      Your Vitals Were     Blood Pressure Pulse Temperature Respirations Height Weight    105/65 79 98.4  F (36.9  C) (Oral) 15 1.641 m (5' 4.6\") 54.6 kg (120 lb 5.9 oz)    Pulse Oximetry BMI (Body Mass Index)                100% 20.28 kg/m2          [employer] Information     [facility] lets you send messages to your doctor, view your test results, renew your prescriptions, schedule appointments and more. To sign up, go to [email or web address] . Click on \"Log in\" on the left side of the screen, which will take you to the Welcome page. Then click on \"Sign up Now\" on the right side of the page.     You will be asked to enter the access code listed below, as well as some personal information. Please follow the directions to create your username and password.     Your access code is: [email or web address]  Expires: 2017  9:58 AM     Your access code will  in 90 days. If you need help or a new code, please call your [facility] clinic or [phone].        Care EveryWhere ID     This is your Care EveryWhere ID. This could be used by other organizations to access your [facility] medical records  [id number]        Equal Access to Services     SAHARA CLARK : Wm Wing, jose hastings, layne katomi spence, katrina peraza. So [facility] [phone].    ATENCIÓN: Si habla español, tiene a [place] disposición servicios gratuitos de asistencia lingüística. Llame al [phone].    We comply with applicable federal " civil rights laws and [place] laws. We do not discriminate on the basis of race, color, national origin, age, disability, sex, sexual orientation, or gender identity.               Review of your medicines      START taking        Dose / Directions    oxyCODONE IR 5 MG tablet   Commonly known as:  ROXICODONE   Used for:  Postoperative eye state        Dose:  5 mg   Take 1 tablet (5 mg) by mouth every 6 hours as needed for pain Maximum 4 tablet(s) per day   Quantity:  12 tablet   Refills:  0         CONTINUE these medicines which have NOT CHANGED        Dose / Directions    HYDROXYZINE PAMOATE PO        Dose:  25 mg   Take 25 mg by mouth nightly as needed   Refills:  0            Where to get your medicines      Some of these will need a paper prescription and others can be bought over the counter. Ask your nurse if you have questions.     Bring a paper prescription for each of these medications     oxyCODONE IR 5 MG tablet                Protect others around you: Learn how to safely use, store and throw away your medicines at www.disposemymeds.org.             Medication List: This is a list of all your medications and when to take them. Check marks below indicate your daily home schedule. Keep this list as a reference.      Medications           Morning Afternoon Evening Bedtime As Needed    HYDROXYZINE PAMOATE PO   Take 25 mg by mouth nightly as needed                                oxyCODONE IR 5 MG tablet   Commonly known as:  ROXICODONE   Take 1 tablet (5 mg) by mouth every 6 hours as needed for pain Maximum 4 tablet(s) per day

## 2017-11-15 NOTE — IP AVS SNAPSHOT
[place] [address] [place] [address]    Phone:  [phone]                                       After Visit Summary   11/15/2017    Bailey Amin    MRN: [id number]           After Visit Summary Signature Page     I have received my discharge instructions, and my questions have been answered. I have discussed any challenges I see with this plan with the nurse or doctor.    ..........................................................................................................................................  Patient/Patient Representative Signature      ..........................................................................................................................................  Patient Representative Print Name and Relationship to Patient    ..................................................               ................................................  Date                                            Time    ..........................................................................................................................................  Reviewed by Signature/Title    ...................................................              ..............................................  Date                                                            Time

## 2017-11-15 NOTE — ANESTHESIA PREPROCEDURE EVALUATION
HPI:  Bailey Amin is a 47 year old female with a primary diagnosis of bilateral strabismus who presents for strabismus repair.    Otherwise, she  has a past medical history of ASCUS with positive high risk HPV cervical; Hydronephrosis with renal and ureteral calculus obstruction; Insomnia; Intermittent exotropia, monocular; and Obese.  she  has a past surgical history that includes colposcopy,vulva; Dental surgery; and LEEP procedure.      Anesthesia Evaluation     . Pt has had prior anesthetic.     No history of anesthetic complications          ROS/MED HX    ENT/Pulmonary:      (-) asthma and sleep apnea   Neurologic: Comment:   - intermittent back pain/sciatica     (-) seizures and Spinal cord injury   Cardiovascular:        (-) hypertension and CAD   METS/Exercise Tolerance:  >4 METS   Hematologic:  - neg hematologic  ROS       Musculoskeletal:  - neg musculoskeletal ROS       GI/Hepatic:  - neg GI/hepatic ROS      (-) GERD and liver disease   Renal/Genitourinary:     (+) chronic renal disease (Hydronephrosis and kidney stones),       Endo:     (+) Obesity, .   (-) Type II DM and thyroid disease   Psychiatric:     (+) psychiatric history (Insomnia)       Infectious Disease:  - neg infectious disease ROS       Malignancy:         Other:    (+) C-spine cleared: N/A, H/O Chronic Pain,no other significant disability                    Physical Exam  Normal systems: pulmonary and dental    Airway   Mallampati: I  TM distance: >3 FB  Neck ROM: full    Dental     Cardiovascular   Rhythm and rate: regular and normal  (-) no murmur    Pulmonary    breath sounds clear to auscultation(-) no rhonchi, no wheezes and no stridor                      PCP: Alexis De Luna    No results found for: WBC, HGB, HCT, PLT, CRP, SED, NA, POTASSIUM, CHLORIDE, CO2, BUN, CR, GLC, WILMER, PHOS, MAG, ALBUMIN, PROTTOTAL, ALT, AST, GGT, ALKPHOS, BILITOTAL, BILIDIRECT, LIPASE, AMYLASE, NATANAEL, PTT, INR, FIBR, TSH, T4, T3, HCG, HCGS, CKTOTAL,  "CKMB, TROPN      Preop Vitals  BP Readings from Last 3 Encounters:   11/15/17 111/74    Pulse Readings from Last 3 Encounters:   11/15/17 79      Resp Readings from Last 3 Encounters:   11/15/17 16    SpO2 Readings from Last 3 Encounters:   11/15/17 100%      Temp Readings from Last 1 Encounters:   11/15/17 36.9  C (98.4  F) (Oral)    Ht Readings from Last 1 Encounters:   11/15/17 1.641 m (5' 4.6\")      Wt Readings from Last 1 Encounters:   11/15/17 54.6 kg (120 lb 5.9 oz)    Estimated body mass index is 20.28 kg/(m^2) as calculated from the following:    Height as of this encounter: 1.641 m (5' 4.6\").    Weight as of this encounter: 54.6 kg (120 lb 5.9 oz).     Current Medications  No current outpatient prescriptions on file.         LDA         Anesthesia Plan      History & Physical Review  History and physical reviewed and following examination; no interval change.    ASA Status:  2 .    NPO Status:  > 6 hours    Plan for General and LMA with Intravenous induction. Maintenance will be Balanced.    PONV prophylaxis:  Ondansetron (or other 5HT-3) and Dexamethasone or Solumedrol  Consented Person: Patient  Consented via: Direct conversation    Discussed common and potentially harmful risks for General Anesthesia.   These risks include, but were not limited to: Conversion to secured airway, Sore throat, Airway injury, Dental injury, Aspiration, Respiratory issues (Bronchospasm, Laryngospasm, Desaturation), Hemodynamic issues (Arrhythmia, Hypotension, Ischemia), Potential long term consequences of respiratory and hemodynamic issues, PONV, Emergence delirium  Risks of invasive procedures were not discussed: N/A    All questions were answered.      Postoperative Care  Postoperative pain management:  Multi-modal analgesia.      Consents  Anesthetic plan, risks, benefits and alternatives discussed with:  Patient.  Use of blood products discussed: No .   .        Chapincito Major, 11/15/2017, 12:55 PM  "

## 2017-11-15 NOTE — OR NURSING
Arrived PACU per cart.  Pt waking up briefly to voice and touch.  Denies pain and back to sleep.  Moves all fours.  Small amount of oozing from each eye of bloody drainage.

## 2017-11-15 NOTE — ANESTHESIA POSTPROCEDURE EVALUATION
Patient: Bailey Amin    Procedure(s):  Bilateral Strabismus Repair - Wound Class: I-Clean    Diagnosis:Strabismus  Diagnosis Additional Information: No value filed.    Anesthesia Type:  General, LMA    Note:  Anesthesia Post Evaluation    Patient location during evaluation: PACU  Patient participation: Able to fully participate in evaluation  Level of consciousness: awake  Pain management: adequate  Airway patency: patent  Cardiovascular status: acceptable and stable  Respiratory status: acceptable and room air  Hydration status: acceptable  PONV: none     Anesthetic complications: None          Last vitals:  Vitals:    11/15/17 1545 11/15/17 1600 11/15/17 1645   BP: 108/67 105/65 104/71   Pulse:      Resp: 14 15 16   Temp:  36.9  C (98.4  F) 36.9  C (98.4  F)   SpO2: 100% 100% 97%         Electronically Signed By: Robert Del Castillo MD  November 15, 2017  4:49 PM

## 2017-11-15 NOTE — PROGRESS NOTES
Chief Complaint(s) & History of Present Illness  Chief Complaint   Patient presents with     Exotropia Follow Up     RSE. XT stable, vision is good.           Assessment and Plan:      Bailey Amin is a 47 year old female who presents with:     Intermittent exotropia, monocular  stable  - Sensorimotor       PLAN:  Surgery as planned

## 2017-11-15 NOTE — DISCHARGE INSTRUCTIONS
Instructions for after your eye surgery:  Apply ice packs to eyes on and off as tolerated for 2 days.    Oxycodone 5mg has been prescribed. You do not need to fill this prescription but it is provided for breakthrough pain. Take 1 pill every six hours as needed for pain.     Acetaminophen (Tylenol) and NSAIDs (Motrin, Ibuprofen, Advil, Naproxen) may be given per the dosing instructions on the label for pain every 6 hours.  I recommend alternating these two types of medicine every 3 hours so that Bailey receives one of them for pain control every 3 hours.  (For example: acetaminophen - wait 3 hours - ibuprofen - wait 3 hours - acetaminophen - wait 3 hours - ibuprofen - etc.)    Avoid all eye pressure or trauma. No eye rubbing, straining, or athletics for 1 week.     No water in the face (including bathing) for 1 week. Instill your antibiotic eye drops after bathing for the first week. No swimming for 2 weeks.    Same-Day Surgery   Adult Discharge Orders & Instructions     For 24 hours after surgery:  1. Get plenty of rest.  A responsible adult must stay with you for at least 24 hours after you leave the hospital.   2. Pain medication can slow your reflexes. Do not drive or use heavy equipment.  If you have weakness or tingling, don't drive or use heavy equipment until this feeling goes away.  3. Mixing alcohol and pain medication can cause dizziness and slow your breathing. It can even be fatal. Do not drink alcohol while taking pain medication.  4. Avoid strenuous or risky activities.  Ask for help when climbing stairs.   5. You may feel lightheaded.  If so, sit for a few minutes before standing.  Have someone help you get up.   6. If you have nausea (feel sick to your stomach), drink only clear liquids such as apple juice, ginger ale, broth or 7-Up.  Rest may also help.  Be sure to drink enough fluids.  Move to a regular diet as you feel able. Take pain medications with a small amount of solid food, such as toast or  crackers, to avoid nausea.   7. A slight fever is normal. Call the doctor if your fever is over 100 F (37.7 C) (taken under the tongue) or lasts longer than 24 hours.  8. You may have a dry mouth, muscle aches, trouble sleeping or a sore throat.  These symptoms should go away after 24 hours.  9. Do not make important or legal decisions.   Pain Management:      1. Take pain medication (if prescribed) for pain as directed by your physician.        2. WARNING: If the pain medication you have been prescribed contains Tylenol  (acetaminophen), DO NOT take additional doses of Tylenol (acetaminophen).     Call your doctor for any of the followin.  Signs of infection (fever, growing tenderness at the surgery site, severe pain, a large amount of drainage or bleeding, foul-smelling drainage, redness, swelling).    2.  It has been over 8 to 10 hours since surgery and you are still not able to urinate (pee).    3.  Headache for over 24 hours.    4.  Numbness, tingling or weakness the day after surgery (if you had spinal anesthesia).  To contact a doctor, call _____________________________________ or:      [phone] and ask for the Resident On Call for:          __________________________________________ (answered 24 hours a day)      Emergency Department:  [facility]: [phone]  [facility]: [phone]               Rev. 10/2014   Scopolamine Patch  (Absorbed through the skin)    The Scopolamine Patch prevents nausea and vomiting caused by motion sickness or anesthesia and surgery in adults.    Brand Name(s): Transderm Scop, Transderm-Scope  There may be other brand names for this medicine.    When This Medicine Should Not Be Used:  You should not use this medicine if you have had an allergic reaction to scopolamine, or if you have narrow angle glaucoma.    How to Use This Medicine:    Your doctor will tell you how many patches to use, where to apply them, and how often to  apply them.     Do not use more patches or apply them more often than your doctor tells you to.    This medicine comes with patient instructions. Read and follow these instructions carefully. Ask your doctor or pharmacist if you have any questions.    To prevent motion sickness, apply the patch at least 4 hours before you need it.    Wash and dry your hands thoroughly before applying the patch.    Leave the patch in its sealed wrapper until you are ready to put it on. Tear the wrapper open carefully. NEVER CUT the wrapper or the patch with scissors. Do not use any patch that has been cut by accident.    Take the liner off the sticky side before applying.    Apply the patch to dry, hairless skin behind the ear.    If the patch is loose or falls off, apply a new patch at a different place behind the ear.    After you take off the patch, wash the place where the patch was and your hands thoroughly.    Only one patch should be used at any time.    If a dose is missed:  If you forget to wear or change a patch, put one on as soon as you can. If it is almost time to put on your next patch, wait until then to apply a new patch and skip the one you missed. Do not apply extra patches to make up for a missed dose.    How to Store and Dispose of This Medicine:    Store the patches at room temperature in a closed container, away from heat, moisture and direct light.    Fold the used patch in half with the sticky sides together. Throw any used patch away so that children or pets cannot get to it. You will also need to throw away old patches after the expiration date has passed.    Keep all medicine away from children and never share your medicine with anyone.    Ask your doctor or pharmacist before using any other medicine, including over-the-counter medicines, vitamins, and herbal products.  Tell your doctor if you are using any medicines that make you sleepy. These include sleeping pills, cold and allergy medicine, narcotic  pain relievers and sedatives.     Tell your doctor if you are using any medicine that make you sleepy. These include sleeping pills, eliel and allergy medicine, narcotic pain relievers and sedatives.    Do not drink alcohol while you are using this medicine.     Warnings While Using This Medicine:    Make sure your doctor knows if you are pregnant or breastfeeding or if you have glaucoma, prostate problems, trouble urinating, blocked bowels, liver disease, kidney disease or a history of seizures or mental illness.    This medicine can cause blurring of vision and other vision problems if it comes in contact with the eyes. This medicine may also cause problems with urination. If any of these reactions occur, remove the patch and call your doctor right away.    This medicine may make you dizzy or drowsy. Avoid driving, using machines, or doing anything else that could be dangerous if you are not alert. If you plan to participate in underwater sports, this medicine may cause disorienting effects. If this is a concern for you, talk with your doctor.    This medicine may make you sweat less and cause your body to get too hot. Be careful in hot weather, when you are exercising or if using sauna or whirlpool.    Make sure any doctor or dentist who treats you knows that you are using this medicine. This medicine may affect the results of certain medical tests.    Skin burns have been reported at the patch site in several patients wearing an aluminized transdermal system during a magnetic resonance imaging scan (MRI). Because Transderm Scop contains aluminum, it is recommended to remove the system before undergoing an MRI.    Call your doctor right away if you notice any of these side effects:    Allergic reaction: Itching or hives, swelling in your face or hands, swelling or tingling in your mouth or throat, chest tightness, trouble breathing    Blurred vision    Confusion or memory loss    Fast, slow or uneven  heartbeat    Lightheadedness, dizziness, drowsiness or fainting    Seeing, hearing or feeling things that are not there    Severe eye pain    Trouble urinating    If you notice these less serious side effects, talk with your doctor:    Dry mouth    Dry, itchy or red eyes    Restlessness    Skin rash or redness    If you notice other side effects that you think are caused by this medicine, tell your doctor immediately.    Rev. 4/2014    Strabismus Repair Discharge Instructions    Dr. Marianna Narayan, Dr. Uziel Love      Your eye doctor performed surgery to help align your eye(s). During surgery, certain eye muscles are adjusted. This helps the muscles better control how the eye moves. Often, surgery is done in addition to other treatments.   How Surgery Works  Strabismus surgery is a safe, common operation. The doctor changes the placement or length of an eye muscle. This small change can pull the eye into proper alignment. The two most common methods of surgery are:    Recession, in which a muscle is moved to a new position on the eye.    Resection, in which a small section of an eye muscle is removed.  What to Expect  It is normal to experience the following:    Eye Redness. This will resolve slowly over 2- 4 weeks.    Pink tinged tears    Swollen, painful or itchy eyes    Sensitivity to bright lights.    Trouble opening eyes for 1-2 days.    Feeling dizzy or off balance until you get used to seeing differently.  After Surgery Care    Avoid rubbing your eyes.  o You may use cool cloths to help with pain and/or itching.    Minimize direct contact with water for 1 week. Showering or bathing is allowed.  o You may use a warm, wet washcloth to remove any dried drainage from the eye. Wipe eye from inner corner to the outer corner of the eye.     No swimming for 1 week.    Use sunglasses or a hat to protect eyes from bright lights if you are light sensitive.    You may wear glasses as soon as needed.    No heavy lifting or  contact sports for 1 week.     Use eye drops or eye ointment as directed to prevent infection and decrease swelling. Avoid touching surface of eye with the tube or bottle.   Suture Care  Sutures will dissolve over several weeks; they will not need to be removed.   Adjustable sutures may have been placed during surgery. You may need to stay in the recovery room for a longer period after surgery before a possible suture adjustment is performed. Once the suture is adjusted you will be sent home.   When to Call the Doctor     Eyelids are progressively getting more swollen and painful after 24 hours.    You see a dramatic change in the position of the eye over time.    Frequent or continuous vomiting.    Green or yellow drainage from the eye.    Temperature over 101 degrees.  If your child experiences worsening RSVP (Redness, Sensitivity to light, Vision, Pain), or develops fever or worsening discharge, call EITHER    [phone] (8am-4:30pm Monday-Friday)    [phone] (after hours & weekends)  and ask to speak with the Ophthalmology Resident or Fellow On-Call or return to the eye clinic or emergency room immediately.        If your child is unable to tolerate food and drink, vomits 3 times, or appears to have decreased alertness or lethargy, return to the emergency room immediately. These can be signs of delayed gastrointestinal wake-up after anesthesia and your child may need IV fluids to prevent dehydration.    Follow Up  Follow up with your doctor tomorrow as scheduled  Rev. 3/2014            Return for follow-up with Dr. Narayan as scheduled.  If you do not have an appointment already, please call to arrange follow-up     [place]: Leeann Goodman at [phone] or our  at [phone]        If Bailey Amin experiences worsening RSVP (Redness, Sensitivity to light, Vision, Pain), or if Bailey develops a fever (temperature greater than 100.4 F) or worsening discharge or if you have any  other concerns:        call [phone] (during business hours) or [phone] (after hours & weekends) and ask to speak with the Ophthalmology Resident or Fellow On-Call   OR    return to the eye clinic or emergency room immediately.     If Bailey is unable to tolerate food and drink, vomits 3 times, or appears to have decreased alertness or lethargy, return to the emergency room immediately as these can be signs of delayed stomach wake-up after anesthesia and Bailey may need IV fluids to prevent dehydration.    For assistance from an :    7 AM - 6 PM on Monday - Friday, and 7 AM - 4:30 PM on Saturday & Sunday: call [phone], then select option 3.    After hours: call [phone] and ask the  for  assistance.

## 2017-11-17 ENCOUNTER — OFFICE VISIT (OUTPATIENT)
Dept: OPHTHALMOLOGY | Facility: CLINIC | Age: 47
End: 2017-11-17
Attending: OPHTHALMOLOGY
Payer: COMMERCIAL

## 2017-11-17 DIAGNOSIS — Z98.890 POSTSURGICAL STATE, EYE: Primary | ICD-10-CM

## 2017-11-17 DIAGNOSIS — H50.30 INTERMITTENT EXOTROPIA, MONOCULAR: ICD-10-CM

## 2017-11-17 PROCEDURE — 99213 OFFICE O/P EST LOW 20 MIN: CPT | Mod: 25,ZF

## 2017-11-17 RX ORDER — TOBRAMYCIN 3 MG/ML
1 SOLUTION/ DROPS OPHTHALMIC 2 TIMES DAILY
Qty: 1 BOTTLE | Refills: 0 | Status: SHIPPED | OUTPATIENT
Start: 2017-11-17 | End: 2018-04-20

## 2017-11-17 ASSESSMENT — REFRACTION_WEARINGRX
OD_AXIS: 175
OS_SPHERE: -4.25
OD_ADD: +2.00
OS_ADD: +2.00
OS_AXIS: 015
OD_CYLINDER: +1.00
SPECS_TYPE: SVL
OD_SPHERE: -3.25
OS_CYLINDER: +1.25

## 2017-11-17 ASSESSMENT — VISUAL ACUITY
OS_CC+: -2
CORRECTION_TYPE: GLASSES
OD_CC+: +
OD_CC: 20/20
METHOD: SNELLEN - LINEAR
OS_CC: 20/15

## 2017-11-17 ASSESSMENT — SLIT LAMP EXAM - LIDS
COMMENTS: NORMAL
COMMENTS: NORMAL

## 2017-11-17 ASSESSMENT — EXTERNAL EXAM - LEFT EYE: OS_EXAM: NORMAL

## 2017-11-17 ASSESSMENT — EXTERNAL EXAM - RIGHT EYE: OD_EXAM: NORMAL

## 2017-11-17 NOTE — MR AVS SNAPSHOT
After Visit Summary   2017    Bailey Amin    MRN: [id number]           Patient Information     Date Of Birth          1970        Visit Information        Provider Department      2017 11:00 AM Marianna Narayan MD [facility]        Today's Diagnoses     Postsurgical state, eye    -  1    Intermittent exotropia, monocular           Follow-ups after your visit        Follow-up notes from your care team     Return in about 3 months (around 2018).      Your next 10 appointments already scheduled     2018  3:00 PM CST   Return Pediatric Visit with Marianna Narayan MD   [facility] ([facility])    [address]  [place] [address] [facility] [address] [phone]              Who to contact     Please call your clinic at [phone] to:    Ask questions about your health    Make or cancel appointments    Discuss your medicines    Learn about your test results    Speak to your doctor   If you have compliments or concerns about an experience at your clinic, or if you wish to file a complaint, please contact [facility] Patient Relations at [phone] or email us at [email or web address]         Additional Information About Your Visit        MyChart Information     Appointeddhart is an electronic gateway that provides easy, online access to your medical records. With MEPS Real-Time, you can request a clinic appointment, read your test results, renew a prescription or communicate with your care team.     To sign up for [email or web address] visit the website at [email or web address]   You will be asked to enter the access code listed below, as well as some personal information. Please follow the directions to create your username and password.     Your access code is: [email or web address]  Expires: 2017  9:58 AM     Your access code will  in 90 days. If you need help or a new code, please contact your  [facility] or call [phone] for assistance.        Care EveryWhere ID     This is your Care EveryWhere ID. This could be used by other organizations to access your [facility] medical records  [id number]         Blood Pressure from Last 3 Encounters:   11/15/17 95/65    Weight from Last 3 Encounters:   11/15/17 54.6 kg (120 lb 5.9 oz)              Today, you had the following     No orders found for display         Today's Medication Changes          These changes are accurate as of: 11/17/17  1:44 PM.  If you have any questions, ask your nurse or doctor.               Start taking these medicines.        Dose/Directions    tobramycin 0.3 % ophthalmic solution   Commonly known as:  TOBREX   Used for:  Postsurgical state, eye   Started by:  Marianna Narayan MD        Dose:  1 drop   Place 1 drop into both eyes 2 times daily For 5 days   Quantity:  1 Bottle   Refills:  0            Where to get your medicines      These medications were sent to [facility] [id number] - [place], [place] - [address] AT [facility] [address]  [address], [place] [address]    Hours:  24-hours Phone:  [phone]     tobramycin 0.3 % ophthalmic solution                Primary Care Provider Office Phone # Fax Bret De Luna [phone]       [address]  [facility] [address]        Equal Access to Services     SAHARA CLARK AH: Hadii aad ku hadasho Soomaali, waaxda luqadaha, qaybta kaalmada adeegyada, katrina graham haybailey arguelles . So [facility] [phone].    ATENCIÓN: Si habla español, tiene a [place] disposición servicios gratuitos de asistencia lingüística. Leyda al [phone].    We comply with applicable federal civil rights laws and [place] laws. We do not discriminate on the basis of race, color, national origin, age, disability, sex, sexual orientation, or gender identity.            Thank you!     Thank  you for choosing [facility]  for your care. Our goal is always to provide you with excellent care. Hearing back from our patients is one way we can continue to improve our services. Please take a few minutes to complete the written survey that you may receive in the mail after your visit with us. Thank you!             Your Updated Medication List - Protect others around you: Learn how to safely use, store and throw away your medicines at www.disposemymeds.org.          This list is accurate as of: 11/17/17  1:44 PM.  Always use your most recent med list.                   Brand Name Dispense Instructions for use Diagnosis    HYDROXYZINE PAMOATE PO      Take 25 mg by mouth nightly as needed        oxyCODONE IR 5 MG tablet    ROXICODONE    12 tablet    Take 1 tablet (5 mg) by mouth every 6 hours as needed for pain Maximum 4 tablet(s) per day    Postoperative eye state       tobramycin 0.3 % ophthalmic solution    TOBREX    1 Bottle    Place 1 drop into both eyes 2 times daily For 5 days    Postsurgical state, eye

## 2017-11-17 NOTE — NURSING NOTE
Chief Complaint   Patient presents with     Post Op (Ophthalmology) Both Eyes     some irritation and pain, but improveing. Rare diplpoia in far left gaze only. Doesn't like ointment, blurs vision, would like to change to a drop

## 2017-11-28 NOTE — OP NOTE
DATE OF SERVICE:  11/15/2017.      PREOPERATIVE DIAGNOSIS:  Decompensated intermittent exotropia.      POSTOPERATIVE DIAGNOSIS:  Decompensated intermittent exotropia.      PROCEDURES:   1.  Forced duction testing.   2.  Right lateral rectus recession of 10.0 mm.   3.  Left lateral rectus recession of 9.5 mm.      SURGEON:  Marianna Narayan MD      FIRST ASSISTANT:  Tomás Patton MD      ANESTHESIA:  General.      ESTIMATED BLOOD LOSS:  1 mL.      COMPLICATIONS:  None.      INDICATIONS FOR PROCEDURE:  Ms. Bailey Amin is a woman who has noted increasing asthenopia and eye fatigue and was found to have a very large angle intermittent exotropia with very poor control.  The risks, benefits and alternatives to strabismus repair to restore her binocular alignment were discussed including but not limited to infection, bleeding, loss of vision, over or under correction of her alignment, poor cosmesis, need for further surgery and diplopia.  She elected to proceed.      DETAILS OF PROCEDURE:  After informed consent was obtained, Ms. Amin was taken to the operating room where general anesthesia was induced without complication.  She was prepped and draped in sterile ophthalmic fashion.  Timeout was performed.  Lid specula were placed in both eyes and forced duction testing was performed.  There was trace tightness to adduction in each eye.  The lid speculum was removed from the right eye and an occluder was placed.  A 5-0 silk traction sutures were placed at 6 and 12 o'clock of the left eye and the eye was placed in the adducted position.  A temporal conjunctival peritomy was performed.  The infratemporal and supratemporal quadrants were dissected.  The left lateral rectus muscle was hooked using small and Phoenix hooks.  The Tenon's was cleaned posteriorly from the muscle belly.  A 6-0 double-arm Vicryl suture was used to imbricate the muscle at its insertion using central and peripheral locking bites.  The muscle  was disinserted from the globe.  Cautery was used for hemostasis.  A caliper was used to measure 9.5 mm posterior to the original insertion and this was marked with a marking pen.  Scleral passes were performed at these marks and the muscle was tied down.  An 8-0 Vicryl suture was used to repair the conjunctiva.  The lid speculum and traction sutures were removed from the left eye and an occluder was placed.  Attention was then turned to the right eye where an identical procedure was performed except the right lateral rectus muscle was recessed 10.0 mm.  TobraDex ointment was placed in both eyes.  Ms. Carranza tolerated the procedure well and went to recovery room in stable condition.  She will follow up on postoperative week #1.         RADHA LIN MD             D: 2017 18:14   T: 2017 03:02   MT: sweetie      Name:     BRITTANY CARRANZA   MRN:      [id number]        Account:        [id number]   :      1970           Procedure Date: 11/15/2017      Document: [id number]

## 2018-02-08 ENCOUNTER — OFFICE VISIT (OUTPATIENT)
Dept: OPHTHALMOLOGY | Facility: CLINIC | Age: 48
End: 2018-02-08
Attending: OPHTHALMOLOGY
Payer: COMMERCIAL

## 2018-02-08 DIAGNOSIS — H50.30 INTERMITTENT EXOTROPIA, MONOCULAR: Primary | ICD-10-CM

## 2018-02-08 PROCEDURE — G0463 HOSPITAL OUTPT CLINIC VISIT: HCPCS | Mod: 25,ZF

## 2018-02-08 PROCEDURE — 92015 DETERMINE REFRACTIVE STATE: CPT | Mod: ZF

## 2018-02-08 ASSESSMENT — VISUAL ACUITY
OD_CC+: -2
OD_CC: 20/30
OS_CC: 20/20
CORRECTION_TYPE: GLASSES
OS_CC: J1+
METHOD: SNELLEN - LINEAR
OS_CC+: +2
OD_CC: J1+

## 2018-02-08 ASSESSMENT — REFRACTION_WEARINGRX
OS_CYLINDER: +1.25
SPECS_TYPE: SVL
OS_ADD: +2.00
OD_AXIS: 175
OS_AXIS: 015
OS_SPHERE: -4.25
OD_CYLINDER: +1.00
OD_ADD: +2.00
OD_SPHERE: -3.25

## 2018-02-08 ASSESSMENT — REFRACTION_MANIFEST
OD_AXIS: 180
OS_ADD: +1.50
OD_SPHERE: -3.50
OD_ADD: +1.50
OS_AXIS: 015
OS_SPHERE: -4.00
OS_CYLINDER: +1.00
OD_CYLINDER: +0.75

## 2018-02-08 NOTE — MR AVS SNAPSHOT
After Visit Summary   2018    Bailey Amin    MRN: [id number]           Patient Information     Date Of Birth          1970        Visit Information        Provider Department      2018 3:00 PM Mairanna Narayan MD [facility]        Today's Diagnoses     Intermittent exotropia, monocular    -  1       Follow-ups after your visit        Your next 10 appointments already scheduled     2018 11:15 AM CDT   ORTHOPTICS with [facility] EYE ORTHOPTICS   [facility] ([facility])    [address]  [place] [address] [place] [id number] [phone]            2018   Procedure with Marianna Narayan MD   [facility], [place], Same Day Surgery (--)    [address]  [facility] [address] [phone]            May 03, 2018  9:20 AM CDT   Post-Op with Marianna Narayan MD   [facility] ([facility])    [address]  [place] [address] [place] [id number] [phone]              Who to contact     Please call your clinic at [phone] to:    Ask questions about your health    Make or cancel appointments    Discuss your medicines    Learn about your test results    Speak to your doctor            Additional Information About Your Visit        MyChart Information     [employer] is an electronic gateway that provides easy, online access to your medical records. With [employer], you can request a clinic appointment, read your test results, renew a prescription or communicate with your care team.     To sign up for [employer] visit the website at [email or web address]   You will be asked to enter the access code listed below, as well as some personal information. Please follow the directions to create your username and password.     Your access code is: [email or web address]  Expires: 2018  9:53 AM     Your access code will  in 90 days. If you need help or a new code, please contact your [facility]  [place] [facility] or call [phone] for assistance.        Care EveryWhere ID     This is your Care EveryWhere ID. This could be used by other organizations to access your [place] medical records  [id number]         Blood Pressure from Last 3 Encounters:   11/15/17 95/65    Weight from Last 3 Encounters:   11/15/17 54.6 kg (120 lb 5.9 oz)              Today, you had the following     No orders found for display       Primary Care Provider Office Phone # Fax #    Alexis De Luna [phone]       [address] [address]        Equal Access to Services     [facility]: Hadii aad ku hadasho Soomaali, waaxda luqadaha, qaybta kaalmada adeegyada, katrina graham hayciarann adeeg kranthi arguelles . So [facility] [phone].    ATENCIÓN: Si habla español, tiene a [place] disposición servicios gratuitos de asistencia lingüística. Llame al [phone].    We comply with applicable federal civil rights laws and [place] laws. We do not discriminate on the basis of race, color, national origin, age, disability, sex, sexual orientation, or gender identity.            Thank you!     Thank you for choosing [facility] EYE GENERAL  for your care. Our goal is always to provide you with excellent care. Hearing back from our patients is one way we can continue to improve our services. Please take a few minutes to complete the written survey that you may receive in the mail after your visit with us. Thank you!             Your Updated Medication List - Protect others around you: Learn how to safely use, store and throw away your medicines at www.disposemymeds.org.          This list is accurate as of 2/8/18 11:59 PM.  Always use your most recent med list.                   Brand Name Dispense Instructions for use Diagnosis    HYDROXYZINE PAMOATE PO      Take 25 mg by mouth nightly as needed        oxyCODONE IR 5 MG tablet    ROXICODONE    12 tablet    Take 1 tablet (5 mg) by mouth  every 6 hours as needed for pain Maximum 4 tablet(s) per day    Postoperative eye state       tobramycin 0.3 % ophthalmic solution    TOBREX    1 Bottle    Place 1 drop into both eyes 2 times daily For 5 days    Postsurgical state, eye

## 2018-02-09 ENCOUNTER — TELEPHONE (OUTPATIENT)
Dept: OPHTHALMOLOGY | Facility: CLINIC | Age: 48
End: 2018-02-09

## 2018-02-15 DIAGNOSIS — H50.15 ALTERNATING EXOTROPIA: Primary | ICD-10-CM

## 2018-03-01 ASSESSMENT — EXTERNAL EXAM - LEFT EYE: OS_EXAM: NORMAL

## 2018-03-01 ASSESSMENT — EXTERNAL EXAM - RIGHT EYE: OD_EXAM: NORMAL

## 2018-03-01 ASSESSMENT — SLIT LAMP EXAM - LIDS
COMMENTS: NORMAL
COMMENTS: NORMAL

## 2018-03-01 NOTE — PROGRESS NOTES
"Chief Complaints and History of Present Illnesses   Patient presents with     Exotropia Follow Up     Notes blur at distance, feels like she lets her eye drift to avoid the blur and then her eye drifts out. Gls are 1.5 yrs old. No diplopia, no pain, no redness.    Review of systems for the eyes was negative other than the pertinent positives and negatives noted in the HPI.  History is obtained from the patient    Referring provider: Cady Lin     Primary care: Alexis De Luna   Bailey Amin is a 47 year old female who presents with:       ICD-10-CM    1. Intermittent exotropia, monocular H50.30          Plan  Ms. Amin has residual intermittent exotropia and is using convergence to control at distance.  This is causing accommodation and blurring.  I recommend eye muscle surgery. Today with Bailey, I reviewed the indications, risks, benefits, and alternatives of eye muscle surgery including, but not limited to, failure obtain the desired ocular alignment (\"over\" or \"under\" correction), diplopia, and damage to any structure in or around the eye that may necessitate treatment with medicine, laser, or surgery. I further explained that the goal of surgery is to help control Bailey's strabismus. Surgery will not \"cure\" Bailey's strabismus or resolve/prevent the need for refractive corretion. Additional strabismus surgery may be required in the short or long term. I emphasized that regular follow-up to monitor and optimize her vision and alignment would be necessary. We also discussed the risks of surgical injury, bleeding, and infection which may necessitate further medical or surgical treatment and which may result in diplopia, loss of vision, blindness, or loss of the eye(s) in less than 1% of cases and the remote possibility of permanent damage to any organ system or death with the use of general anesthesia.  I explained that we would hide visible scars as much as possible in natural creases but " "that every patient heals and pigments differently resulting in a variable degree of scarring to the eyes or surrounding facial structures after surgery.  I provided multiple opportunities for questions, answered all questions to the best of my ability, and confirmed that my answers and my discussion were understood.  She will think about it and call if she wishes to schedule.  She had a rough recovery from last surgery.     Further details of the management plan can be found in the \"Patient Instructions\" section which was printed and given to the patient at checkout.  Data Unavailable   Attending Physician Attestation:  Complete documentation of historical and exam elements from today's encounter can be found in the full encounter summary report (not reduplicated in this progress note).  I personally obtained the chief complaint(s) and history of present illness.  I confirmed and edited as necessary the review of systems, past medical/surgical history, family history, social history, and examination findings as documented by others; and I examined the patient myself.  I personally reviewed the relevant tests, images, and reports as documented above.  I formulated and edited as necessary the assessment and plan and discussed the findings and management plan with the patient and family. - Marianna Narayan MD 3/1/2018 9:51 AM    "

## 2018-04-20 RX ORDER — CETIRIZINE HYDROCHLORIDE 10 MG/1
10 TABLET ORAL DAILY
COMMUNITY

## 2018-04-24 ENCOUNTER — OFFICE VISIT (OUTPATIENT)
Dept: OPHTHALMOLOGY | Facility: CLINIC | Age: 48
End: 2018-04-24
Attending: OPHTHALMOLOGY
Payer: COMMERCIAL

## 2018-04-24 DIAGNOSIS — H50.30 INTERMITTENT EXOTROPIA, MONOCULAR: Primary | ICD-10-CM

## 2018-04-24 PROCEDURE — 92060 SENSORIMOTOR EXAMINATION: CPT | Mod: ZF

## 2018-04-24 PROCEDURE — G0463 HOSPITAL OUTPT CLINIC VISIT: HCPCS | Mod: 25,ZF | Performed by: TECHNICIAN/TECHNOLOGIST

## 2018-04-24 ASSESSMENT — VISUAL ACUITY
OD_CC: 20/20
METHOD: SNELLEN - LINEAR
CORRECTION_TYPE: GLASSES
OS_CC: 20/20

## 2018-04-24 ASSESSMENT — REFRACTION_WEARINGRX
SPECS_TYPE: SVL
OD_ADD: +1.50
OD_CYLINDER: +0.75
OD_SPHERE: -3.50
OS_SPHERE: -4.00
OS_AXIS: 020
OD_AXIS: 180
OS_ADD: +1.50
OS_CYLINDER: +0.75

## 2018-04-24 NOTE — PROGRESS NOTES
Chief Complaint(s) & History of Present Illness  Chief Complaint   Patient presents with     Exotropia Follow Up     RSE- X(T), updated glasses since LV, VA seems good cc, X(T) seems to happen more frequently, no c/o diplopia, no monocular lid closure noticed, no AHP        Assessment and Plan:      Bailey Amin is a 48 year old female who presents with:     Intermittent exotropia, monocular  Stable RX(T) with poor control  Pre op EOM photos taken   - Sensorimotor       PLAN:  Continue to surgery as planned.

## 2018-04-24 NOTE — MR AVS SNAPSHOT
After Visit Summary   2018    Bailey Amin    MRN: [id number]           Patient Information     Date Of Birth          1970        Visit Information        Provider Department      2018 8:00 AM [facility] EYE ORTHOPTICS [facility] General        Today's Diagnoses     Intermittent exotropia, monocular    -  1       Follow-ups after your visit        Your next 10 appointments already scheduled     2018   Procedure with Mraianna Narayan MD   [facility], [place], Same Day Surgery (--)    [address]  [facility] [id number] [phone]            May 03, 2018  9:20 AM CDT   Post-Op with Marianna Narayan MD   [facility] Eye General ([facility])    [address]  [place] [address] [place] [address] [phone]              Who to contact     Please call your clinic at [phone] to:    Ask questions about your health    Make or cancel appointments    Discuss your medicines    Learn about your test results    Speak to your doctor            Additional Information About Your Visit        [employer] Information     [employer] is an electronic gateway that provides easy, online access to your medical records. With [employer], you can request a clinic appointment, read your test results, renew a prescription or communicate with your care team.     To sign up for [employer] visit the website at [email or web address]   You will be asked to enter the access code listed below, as well as some personal information. Please follow the directions to create your username and password.     Your access code is: [email or web address]  Expires: 2018 10:53 AM     Your access code will  in 90 days. If you need help or a new code, please contact your [facility] or call [phone] for assistance.        Care EveryWhere ID     This is your Care EveryWhere ID. This could be used by other organizations to access your [facility]  records  [id number]         Blood Pressure from Last 3 Encounters:   11/15/17 95/65    Weight from Last 3 Encounters:   11/15/17 54.6 kg (120 lb 5.9 oz)              We Performed the Following     Sensorimotor        Primary Care Provider Office Phone # Fax #    Alexis De Luna [phone]       [address] [address]        Equal Access to Services     [facility] : Hadii aad ku hadasho Soomaali, waaxda luqadaha, qaybta kaalmada adeegyada, waxay idiin hayaan adeeg khisaacsh lasahiln . So [facility] [phone].    ATENCIÓN: Si habla español, tiene a [place] disposición servicios gratuitos de asistencia lingüística. Llame al [phone].    We comply with applicable federal civil rights laws and [place] laws. We do not discriminate on the basis of race, color, national origin, age, disability, sex, sexual orientation, or gender identity.            Thank you!     Thank you for choosing [facility] EYE GENERAL  for your care. Our goal is always to provide you with excellent care. Hearing back from our patients is one way we can continue to improve our services. Please take a few minutes to complete the written survey that you may receive in the mail after your visit with us. Thank you!             Your Updated Medication List - Protect others around you: Learn how to safely use, store and throw away your medicines at www.disposemymeds.org.          This list is accurate as of 4/24/18  8:34 AM.  Always use your most recent med list.                   Brand Name Dispense Instructions for use Diagnosis    cetirizine 10 MG tablet    zyrTEC     Take 10 mg by mouth daily

## 2018-04-24 NOTE — NURSING NOTE
Chief Complaint   Patient presents with     Exotropia Follow Up     RSE- X(T), updated glasses since LV, VA seems good cc, X(T) seems to happen more frequently, no c/o diplopia, no monocular lid closure noticed, no AHP      HPI    Informant(s):  patient    Affected eye(s):  Both   Symptoms:

## 2018-04-25 ENCOUNTER — SURGERY (OUTPATIENT)
Age: 48
End: 2018-04-25

## 2018-04-25 ENCOUNTER — HOSPITAL ENCOUNTER (OUTPATIENT)
Facility: CLINIC | Age: 48
Discharge: HOME | End: 2018-04-25
Attending: OPHTHALMOLOGY | Admitting: OPHTHALMOLOGY
Payer: COMMERCIAL

## 2018-04-25 ENCOUNTER — ANESTHESIA EVENT (OUTPATIENT)
Dept: SURGERY | Facility: CLINIC | Age: 48
End: 2018-04-25
Payer: COMMERCIAL

## 2018-04-25 ENCOUNTER — ANESTHESIA (OUTPATIENT)
Dept: SURGERY | Facility: CLINIC | Age: 48
End: 2018-04-25
Payer: COMMERCIAL

## 2018-04-25 VITALS
DIASTOLIC BLOOD PRESSURE: 63 MMHG | SYSTOLIC BLOOD PRESSURE: 104 MMHG | OXYGEN SATURATION: 97 % | HEART RATE: 86 BPM | BODY MASS INDEX: 26.63 KG/M2 | RESPIRATION RATE: 16 BRPM | HEIGHT: 65 IN | TEMPERATURE: 97.7 F | WEIGHT: 159.83 LBS

## 2018-04-25 DIAGNOSIS — Z48.810 SURGICAL AFTERCARE, SENSE ORGANS: ICD-10-CM

## 2018-04-25 DIAGNOSIS — H50.30 INTERMITTENT EXOTROPIA, MONOCULAR: Primary | ICD-10-CM

## 2018-04-25 LAB
GLUCOSE BLDC GLUCOMTR-MCNC: 96 MG/DL (ref 70–99)
HCG UR QL: NEGATIVE

## 2018-04-25 PROCEDURE — 36000057 ZZH SURGERY LEVEL 3 1ST 30 MIN - UMMC: Performed by: OPHTHALMOLOGY

## 2018-04-25 PROCEDURE — 25000125 ZZHC RX 250: Performed by: NURSE ANESTHETIST, CERTIFIED REGISTERED

## 2018-04-25 PROCEDURE — 37000009 ZZH ANESTHESIA TECHNICAL FEE, EACH ADDTL 15 MIN: Performed by: OPHTHALMOLOGY

## 2018-04-25 PROCEDURE — 81025 URINE PREGNANCY TEST: CPT | Performed by: ANESTHESIOLOGY

## 2018-04-25 PROCEDURE — 25000128 H RX IP 250 OP 636: Performed by: NURSE ANESTHETIST, CERTIFIED REGISTERED

## 2018-04-25 PROCEDURE — 27210794 ZZH OR GENERAL SUPPLY STERILE: Performed by: OPHTHALMOLOGY

## 2018-04-25 PROCEDURE — 71000027 ZZH RECOVERY PHASE 2 EACH 15 MINS: Performed by: OPHTHALMOLOGY

## 2018-04-25 PROCEDURE — 37000008 ZZH ANESTHESIA TECHNICAL FEE, 1ST 30 MIN: Performed by: OPHTHALMOLOGY

## 2018-04-25 PROCEDURE — 25000125 ZZHC RX 250: Performed by: OPHTHALMOLOGY

## 2018-04-25 PROCEDURE — 71000014 ZZH RECOVERY PHASE 1 LEVEL 2 FIRST HR: Performed by: OPHTHALMOLOGY

## 2018-04-25 PROCEDURE — 25000128 H RX IP 250 OP 636: Performed by: OPHTHALMOLOGY

## 2018-04-25 PROCEDURE — 40000170 ZZH STATISTIC PRE-PROCEDURE ASSESSMENT II: Performed by: OPHTHALMOLOGY

## 2018-04-25 PROCEDURE — 25000128 H RX IP 250 OP 636: Performed by: ANESTHESIOLOGY

## 2018-04-25 PROCEDURE — 25000125 ZZHC RX 250: Performed by: ANESTHESIOLOGY

## 2018-04-25 PROCEDURE — 82962 GLUCOSE BLOOD TEST: CPT

## 2018-04-25 PROCEDURE — 25000566 ZZH SEVOFLURANE, EA 15 MIN: Performed by: OPHTHALMOLOGY

## 2018-04-25 PROCEDURE — 36000059 ZZH SURGERY LEVEL 3 EA 15 ADDTL MIN UMMC: Performed by: OPHTHALMOLOGY

## 2018-04-25 RX ORDER — NALOXONE HYDROCHLORIDE 0.4 MG/ML
.1-.4 INJECTION, SOLUTION INTRAMUSCULAR; INTRAVENOUS; SUBCUTANEOUS
Status: DISCONTINUED | OUTPATIENT
Start: 2018-04-25 | End: 2018-04-25 | Stop reason: HOSPADM

## 2018-04-25 RX ORDER — ONDANSETRON 2 MG/ML
4 INJECTION INTRAMUSCULAR; INTRAVENOUS EVERY 30 MIN PRN
Status: DISCONTINUED | OUTPATIENT
Start: 2018-04-25 | End: 2018-04-25 | Stop reason: HOSPADM

## 2018-04-25 RX ORDER — KETOROLAC TROMETHAMINE 30 MG/ML
INJECTION, SOLUTION INTRAMUSCULAR; INTRAVENOUS PRN
Status: DISCONTINUED | OUTPATIENT
Start: 2018-04-25 | End: 2018-04-25

## 2018-04-25 RX ORDER — LIDOCAINE HYDROCHLORIDE 20 MG/ML
INJECTION, SOLUTION INFILTRATION; PERINEURAL PRN
Status: DISCONTINUED | OUTPATIENT
Start: 2018-04-25 | End: 2018-04-25

## 2018-04-25 RX ORDER — ONDANSETRON 4 MG/1
4 TABLET, ORALLY DISINTEGRATING ORAL EVERY 30 MIN PRN
Status: DISCONTINUED | OUTPATIENT
Start: 2018-04-25 | End: 2018-04-25 | Stop reason: HOSPADM

## 2018-04-25 RX ORDER — EPHEDRINE SULFATE 50 MG/ML
INJECTION, SOLUTION INTRAMUSCULAR; INTRAVENOUS; SUBCUTANEOUS PRN
Status: DISCONTINUED | OUTPATIENT
Start: 2018-04-25 | End: 2018-04-25

## 2018-04-25 RX ORDER — CYCLOBENZAPRINE HCL 10 MG
10 TABLET ORAL
COMMUNITY
Start: 2018-04-19

## 2018-04-25 RX ORDER — DIPHENOXYLATE HYDROCHLORIDE AND ATROPINE SULFATE 2.5; .025 MG/1; MG/1
TABLET ORAL
COMMUNITY
Start: 2007-12-20

## 2018-04-25 RX ORDER — SODIUM CHLORIDE, SODIUM LACTATE, POTASSIUM CHLORIDE, CALCIUM CHLORIDE 600; 310; 30; 20 MG/100ML; MG/100ML; MG/100ML; MG/100ML
INJECTION, SOLUTION INTRAVENOUS CONTINUOUS
Status: DISCONTINUED | OUTPATIENT
Start: 2018-04-25 | End: 2018-04-25 | Stop reason: HOSPADM

## 2018-04-25 RX ORDER — GLYCOPYRROLATE 0.2 MG/ML
INJECTION, SOLUTION INTRAMUSCULAR; INTRAVENOUS PRN
Status: DISCONTINUED | OUTPATIENT
Start: 2018-04-25 | End: 2018-04-25

## 2018-04-25 RX ORDER — FENTANYL CITRATE 50 UG/ML
INJECTION, SOLUTION INTRAMUSCULAR; INTRAVENOUS PRN
Status: DISCONTINUED | OUTPATIENT
Start: 2018-04-25 | End: 2018-04-25

## 2018-04-25 RX ORDER — PROPOFOL 10 MG/ML
INJECTION, EMULSION INTRAVENOUS PRN
Status: DISCONTINUED | OUTPATIENT
Start: 2018-04-25 | End: 2018-04-25

## 2018-04-25 RX ORDER — DEXAMETHASONE SODIUM PHOSPHATE 4 MG/ML
INJECTION, SOLUTION INTRA-ARTICULAR; INTRALESIONAL; INTRAMUSCULAR; INTRAVENOUS; SOFT TISSUE PRN
Status: DISCONTINUED | OUTPATIENT
Start: 2018-04-25 | End: 2018-04-25

## 2018-04-25 RX ORDER — OXYMETAZOLINE HYDROCHLORIDE 0.05 G/100ML
SPRAY NASAL PRN
Status: DISCONTINUED | OUTPATIENT
Start: 2018-04-25 | End: 2018-04-25 | Stop reason: HOSPADM

## 2018-04-25 RX ORDER — FENTANYL CITRATE 50 UG/ML
25-50 INJECTION, SOLUTION INTRAMUSCULAR; INTRAVENOUS EVERY 5 MIN PRN
Status: DISCONTINUED | OUTPATIENT
Start: 2018-04-25 | End: 2018-04-25 | Stop reason: HOSPADM

## 2018-04-25 RX ORDER — OXYCODONE HYDROCHLORIDE 5 MG/1
5 TABLET ORAL EVERY 6 HOURS PRN
Qty: 25 TABLET | Refills: 0 | Status: SHIPPED | OUTPATIENT
Start: 2018-04-25

## 2018-04-25 RX ORDER — BALANCED SALT SOLUTION 6.4; .75; .48; .3; 3.9; 1.7 MG/ML; MG/ML; MG/ML; MG/ML; MG/ML; MG/ML
SOLUTION OPHTHALMIC PRN
Status: DISCONTINUED | OUTPATIENT
Start: 2018-04-25 | End: 2018-04-25 | Stop reason: HOSPADM

## 2018-04-25 RX ORDER — ONDANSETRON 2 MG/ML
INJECTION INTRAMUSCULAR; INTRAVENOUS PRN
Status: DISCONTINUED | OUTPATIENT
Start: 2018-04-25 | End: 2018-04-25

## 2018-04-25 RX ORDER — BETAMETHASONE SODIUM PHOSPHATE AND BETAMETHASONE ACETATE 3; 3 MG/ML; MG/ML
INJECTION, SUSPENSION INTRA-ARTICULAR; INTRALESIONAL; INTRAMUSCULAR; SOFT TISSUE PRN
Status: DISCONTINUED | OUTPATIENT
Start: 2018-04-25 | End: 2018-04-25 | Stop reason: HOSPADM

## 2018-04-25 RX ORDER — NAPROXEN 500 MG/1
TABLET ORAL
COMMUNITY
Start: 2017-07-27

## 2018-04-25 RX ORDER — HYDROMORPHONE HYDROCHLORIDE 1 MG/ML
.3-.5 INJECTION, SOLUTION INTRAMUSCULAR; INTRAVENOUS; SUBCUTANEOUS EVERY 10 MIN PRN
Status: DISCONTINUED | OUTPATIENT
Start: 2018-04-25 | End: 2018-04-25 | Stop reason: HOSPADM

## 2018-04-25 RX ORDER — LIDOCAINE 40 MG/G
CREAM TOPICAL
Status: DISCONTINUED | OUTPATIENT
Start: 2018-04-25 | End: 2018-04-25 | Stop reason: HOSPADM

## 2018-04-25 RX ORDER — MOMETASONE FUROATE MONOHYDRATE 50 UG/1
2 SPRAY, METERED NASAL
COMMUNITY
Start: 2018-04-19

## 2018-04-25 RX ORDER — SODIUM CHLORIDE, SODIUM LACTATE, POTASSIUM CHLORIDE, CALCIUM CHLORIDE 600; 310; 30; 20 MG/100ML; MG/100ML; MG/100ML; MG/100ML
INJECTION, SOLUTION INTRAVENOUS CONTINUOUS PRN
Status: DISCONTINUED | OUTPATIENT
Start: 2018-04-25 | End: 2018-04-25

## 2018-04-25 RX ORDER — SCOLOPAMINE TRANSDERMAL SYSTEM 1 MG/1
1 PATCH, EXTENDED RELEASE TRANSDERMAL ONCE
Status: COMPLETED | OUTPATIENT
Start: 2018-04-25 | End: 2018-04-25

## 2018-04-25 RX ORDER — HYDROXYZINE PAMOATE 25 MG/1
25 CAPSULE ORAL
COMMUNITY
Start: 2014-08-20

## 2018-04-25 RX ORDER — TRIAMCINOLONE ACETONIDE 55 UG/1
2 SPRAY, METERED NASAL
COMMUNITY
Start: 2018-04-20

## 2018-04-25 RX ADMIN — FENTANYL CITRATE 25 MCG: 50 INJECTION, SOLUTION INTRAMUSCULAR; INTRAVENOUS at 08:59

## 2018-04-25 RX ADMIN — Medication: at 10:06

## 2018-04-25 RX ADMIN — HYDROMORPHONE HYDROCHLORIDE 0.2 MG: 1 INJECTION, SOLUTION INTRAMUSCULAR; INTRAVENOUS; SUBCUTANEOUS at 10:00

## 2018-04-25 RX ADMIN — FENTANYL CITRATE 25 MCG: 50 INJECTION, SOLUTION INTRAMUSCULAR; INTRAVENOUS at 08:22

## 2018-04-25 RX ADMIN — BETAMETHASONE SODIUM PHOSPHATE AND BETAMETHASONE ACETATE 0.2 ML: 3; 3 INJECTION, SUSPENSION INTRA-ARTICULAR; INTRALESIONAL; INTRAMUSCULAR at 09:01

## 2018-04-25 RX ADMIN — KETOROLAC TROMETHAMINE 30 MG: 30 INJECTION, SOLUTION INTRAMUSCULAR at 09:03

## 2018-04-25 RX ADMIN — HYDROMORPHONE HYDROCHLORIDE 0.3 MG: 1 INJECTION, SOLUTION INTRAMUSCULAR; INTRAVENOUS; SUBCUTANEOUS at 09:45

## 2018-04-25 RX ADMIN — PROPOFOL 180 MG: 10 INJECTION, EMULSION INTRAVENOUS at 07:33

## 2018-04-25 RX ADMIN — OXYMETAZOLINE HYDROCHLORIDE 4 ML: 5 SPRAY NASAL at 08:07

## 2018-04-25 RX ADMIN — LIDOCAINE HYDROCHLORIDE 80 MG: 20 INJECTION, SOLUTION INFILTRATION; PERINEURAL at 07:33

## 2018-04-25 RX ADMIN — Medication 0.2 MG: at 07:33

## 2018-04-25 RX ADMIN — FENTANYL CITRATE 25 MCG: 50 INJECTION, SOLUTION INTRAMUSCULAR; INTRAVENOUS at 08:03

## 2018-04-25 RX ADMIN — Medication 5 MG: at 07:49

## 2018-04-25 RX ADMIN — Medication 5 MG: at 07:45

## 2018-04-25 RX ADMIN — ONDANSETRON 4 MG: 2 INJECTION INTRAMUSCULAR; INTRAVENOUS at 09:00

## 2018-04-25 RX ADMIN — MIDAZOLAM 2 MG: 1 INJECTION INTRAMUSCULAR; INTRAVENOUS at 07:26

## 2018-04-25 RX ADMIN — SCOLOPAMINE TRANSDERMAL SYSTEM 1 PATCH: 1 PATCH, EXTENDED RELEASE TRANSDERMAL at 07:01

## 2018-04-25 RX ADMIN — SODIUM CHLORIDE, POTASSIUM CHLORIDE, SODIUM LACTATE AND CALCIUM CHLORIDE: 600; 310; 30; 20 INJECTION, SOLUTION INTRAVENOUS at 07:26

## 2018-04-25 RX ADMIN — TOBRAMYCIN AND DEXAMETHASONE 1 INCH: 3; 1 OINTMENT OPHTHALMIC at 09:03

## 2018-04-25 RX ADMIN — BALANCED SALT SOLUTION 1 APPLICATOR: 6.4; .75; .48; .3; 3.9; 1.7 SOLUTION OPHTHALMIC at 08:07

## 2018-04-25 RX ADMIN — FENTANYL CITRATE 25 MCG: 50 INJECTION, SOLUTION INTRAMUSCULAR; INTRAVENOUS at 08:30

## 2018-04-25 RX ADMIN — BETAMETHASONE SODIUM PHOSPHATE AND BETAMETHASONE ACETATE 0.2 ML: 3; 3 INJECTION, SUSPENSION INTRA-ARTICULAR; INTRALESIONAL; INTRAMUSCULAR at 09:02

## 2018-04-25 RX ADMIN — FENTANYL CITRATE 50 MCG: 50 INJECTION, SOLUTION INTRAMUSCULAR; INTRAVENOUS at 07:42

## 2018-04-25 RX ADMIN — DEXAMETHASONE SODIUM PHOSPHATE 6 MG: 4 INJECTION, SOLUTION INTRAMUSCULAR; INTRAVENOUS at 07:33

## 2018-04-25 ASSESSMENT — COPD QUESTIONNAIRES: COPD: 0

## 2018-04-25 ASSESSMENT — LIFESTYLE VARIABLES: TOBACCO_USE: 0

## 2018-04-25 NOTE — BRIEF OP NOTE
[facility] Brief Operative Note    Pre-operative diagnosis: Strabismus, Alternating Exotropia    Post-operative diagnosis * No post-op diagnosis entered *   Procedure: Procedure(s):  Bilateral Strabimus Repair  - Wound Class: I-Clean   Surgeon: Marianna Narayan MD   Assistants(s): Yevgeniy Correa MD   Estimated blood loss: Less than 10 ml    Specimens: None   Findings: See full operative report

## 2018-04-25 NOTE — DISCHARGE INSTRUCTIONS
Instructions for after your eye surgery:       Apply ice packs to eyes on and off as tolerated for 2 days.    Acetaminophen (Tylenol) and NSAIDs (Motrin, Ibuprofen, Advil, Naproxen) may be given per the dosing instructions on the label for pain every 6 hours.  I recommend alternating these two types of medicine every 3 hours so that Bailey receives one of them for pain control every 3 hours.  (For example: acetaminophen - wait 3 hours - ibuprofen - wait 3 hours - acetaminophen - wait 3 hours - ibuprofen - etc.)    Avoid all eye pressure or trauma. No eye rubbing, straining, or athletics for 1 week.     No water in the face (including bathing) for 1 week. Instill your antibiotic eye drops after bathing for the first week. No swimming for 2 weeks.      Return for follow-up as scheduled.  If you do not have an appointment already, please call to arrange follow-up in 1-2 weeks.    [place]: Leeann Goodman at [phone] or our  at [phone]    [place]: [phone]    If Bailey Amin experiences worsening RSVP (Redness, Sensitivity to light, Vision, Pain), or if Bailey develops a fever (temperature greater than 100.4 F) or worsening discharge or if you have any other concerns:      call [phone] (during business hours) or [phone] (after hours & weekends) and ask to speak with the Ophthalmology Resident or Fellow On-Call   OR    return to the eye clinic or emergency room immediately.     If Bailey is unable to tolerate food and drink, vomits 3 times, or appears to have decreased alertness or lethargy, return to the emergency room immediately as these can be signs of delayed stomach wake-up after anesthesia and Bailey may need IV fluids to prevent dehydration.    For assistance from an :    7 AM - 6 PM on Monday - Friday, and 7 AM - 4:30 PM on Saturday & Sunday: call [phone], then select option 3.    After hours: call [phone] and ask the  for   assistance.         Same-Day Surgery   Adult Discharge Orders & Instructions     For 24 hours after surgery:  1. Get plenty of rest.  A responsible adult must stay with you for at least 24 hours after you leave the hospital.   2. Pain medication can slow your reflexes. Do not drive or use heavy equipment.  If you have weakness or tingling, don't drive or use heavy equipment until this feeling goes away.  3. Mixing alcohol and pain medication can cause dizziness and slow your breathing. It can even be fatal. Do not drink alcohol while taking pain medication.  4. Avoid strenuous or risky activities.  Ask for help when climbing stairs.   5. You may feel lightheaded.  If so, sit for a few minutes before standing.  Have someone help you get up.   6. If you have nausea (feel sick to your stomach), drink only clear liquids such as apple juice, ginger ale, broth or 7-Up.  Rest may also help.  Be sure to drink enough fluids.  Move to a regular diet as you feel able. Take pain medications with a small amount of solid food, such as toast or crackers, to avoid nausea.   7. A slight fever is normal. Call the doctor if your fever is over 100 F (37.7 C) (taken under the tongue) or lasts longer than 24 hours.  8. You may have a dry mouth, muscle aches, trouble sleeping or a sore throat.  These symptoms should go away after 24 hours.  9. Do not make important or legal decisions.   Pain Management:      1. Take pain medication (if prescribed) for pain as directed by your physician.        2. WARNING: If the pain medication you have been prescribed contains Tylenol  (acetaminophen), DO NOT take additional doses of Tylenol (acetaminophen).     Call your doctor for any of the followin.  Signs of infection (fever, growing tenderness at the surgery site, severe pain, a large amount of drainage or bleeding, foul-smelling drainage, redness, swelling).    2.  It has been over 8 to 10 hours since surgery and you are still not able to  urinate (pee).    3.  Headache for over 24 hours.    4.  Numbness, tingling or weakness the day after surgery (if you had spinal anesthesia).  To contact a doctor, call [phone] or:      [phone] and ask for the Resident On Call for: OPTHALMOLOGY (answered 24 hours a day)      Emergency Department:  [place] [facility]: [phone]  [place] [facility]: [phone]               Rev. 10/2014     Strabismus Repair Discharge Instructions    Dr. Marianna Narayan, Dr. Uziel Love      Your eye doctor performed surgery to help align your eye(s). During surgery, certain eye muscles are adjusted. This helps the muscles better control how the eye moves. Often, surgery is done in addition to other treatments.   How Surgery Works  Strabismus surgery is a safe, common operation. The doctor changes the placement or length of an eye muscle. This small change can pull the eye into proper alignment. The two most common methods of surgery are:    Recession, in which a muscle is moved to a new position on the eye.    Resection, in which a small section of an eye muscle is removed.  What to Expect  It is normal to experience the following:    Eye Redness. This will resolve slowly over 2- 4 weeks.    Pink tinged tears    Swollen, painful or itchy eyes    Sensitivity to bright lights.    Trouble opening eyes for 1-2 days.    Feeling dizzy or off balance until you get used to seeing differently.  After Surgery Care    Avoid rubbing your eyes.  o You may use cool cloths to help with pain and/or itching.    Minimize direct contact with water for 1 week. Showering or bathing is allowed.  o You may use a warm, wet washcloth to remove any dried drainage from the eye. Wipe eye from inner corner to the outer corner of the eye.     No swimming for 1 week.    Use sunglasses or a hat to protect eyes from bright lights if you are light sensitive.    You may wear glasses as soon as needed.    No heavy lifting or contact  sports for 1 week.     Use eye drops or eye ointment as directed to prevent infection and decrease swelling. Avoid touching surface of eye with the tube or bottle.   Suture Care  Sutures will dissolve over several weeks; they will not need to be removed.   Adjustable sutures may have been placed during surgery. You may need to stay in the recovery room for a longer period after surgery before a possible suture adjustment is performed. Once the suture is adjusted you will be sent home.   When to Call the Doctor     Eyelids are progressively getting more swollen and painful after 24 hours.    You see a dramatic change in the position of the eye over time.    Frequent or continuous vomiting.    Green or yellow drainage from the eye.    Temperature over 101 degrees.  If your child experiences worsening RSVP (Redness, Sensitivity to light, Vision, Pain), or develops fever or worsening discharge, call EITHER    [phone] (8am-4:30pm Monday-Friday)    [phone] (after hours & weekends)  and ask to speak with the Ophthalmology Resident or Fellow On-Call or return to the eye clinic or emergency room immediately.        If your child is unable to tolerate food and drink, vomits 3 times, or appears to have decreased alertness or lethargy, return to the emergency room immediately. These can be signs of delayed gastrointestinal wake-up after anesthesia and your child may need IV fluids to prevent dehydration.    Follow Up  Follow up with your doctor as scheduled  Rev. 3/2014

## 2018-04-25 NOTE — IP AVS SNAPSHOT
[place] [address] [facility] [address]    Phone:  [phone]                                       After Visit Summary   4/25/2018    Bailey Amin    MRN: [id number]           After Visit Summary Signature Page     I have received my discharge instructions, and my questions have been answered. I have discussed any challenges I see with this plan with the nurse or doctor.    ..........................................................................................................................................  Patient/Patient Representative Signature      ..........................................................................................................................................  Patient Representative Print Name and Relationship to Patient    ..................................................               ................................................  Date                                            Time    ..........................................................................................................................................  Reviewed by Signature/Title    ...................................................              ..............................................  Date                                                            Time

## 2018-04-25 NOTE — ANESTHESIA PREPROCEDURE EVALUATION
Bailey Amin is a 48 year old female with a PMH of  Strabismus, Alternating Exotropia  who is scheduled for Procedure(s):  Bilateral Strabimus Repair  - Wound Class: I-Clean    NPO Status: Adequate.  > 6 hours solids, > 2 hours clear liquids.       Past Surgical History:   Procedure Laterality Date     COLPOSCOPY,VULVA       DENTAL SURGERY       LEEP procedure      mild dysplasia     RECESSION RESECTION (REPAIR STRABISMUS) BILATERAL Bilateral 11/15/2017    Procedure: RECESSION RESECTION (REPAIR STRABISMUS) BILATERAL;  Bilateral Strabismus Repair;  Surgeon: Marianna Narayan MD;  Location: [facility] OR       Anesthesia Evaluation     . Pt has had prior anesthetic. Type: General    No history of anesthetic complications          ROS/MED HX    ENT/Pulmonary:      (-) tobacco use, asthma and COPD   Neurologic:      (-) CVA and TIA   Cardiovascular:        (-) hypertension, CAD, irregular heartbeat/palpitations and stent   METS/Exercise Tolerance:     Hematologic:        (-) anemia   Musculoskeletal:         GI/Hepatic:        (-) GERD and liver disease   Renal/Genitourinary:      Chronic renal disease: hydronephrosis.   Endo:      (-) Type I DM, Type II DM and thyroid disease   Psychiatric:         Infectious Disease:  - neg infectious disease ROS       Malignancy:         Other:                     Physical Exam  Normal systems: cardiovascular, pulmonary and dental    Airway   Mallampati: II  TM distance: >3 FB  Neck ROM: full    Dental     Cardiovascular   Rhythm and rate: regular and normal      Pulmonary    breath sounds clear to auscultation                    Anesthesia Plan      History & Physical Review  History and physical reviewed and following examination; no interval change.    ASA Status:  2 .        Plan for General and LMA with Intravenous induction. Maintenance will be Balanced.    PONV prophylaxis:  Ondansetron (or other 5HT-3), Scopolamine patch and Dexamethasone or Solumedrol       Postoperative  Care  Postoperative pain management:  Oral pain medications and Multi-modal analgesia.      Consents  Anesthetic plan, risks, benefits and alternatives discussed with:  Patient..        Brian Woo MD  7:11 AM April 25, 2018                       .

## 2018-04-25 NOTE — ANESTHESIA POSTPROCEDURE EVALUATION
Patient: Bailey Amin    Procedure(s):  Bilateral Strabimus Repair  - Wound Class: I-Clean    Diagnosis:Strabismus, Alternating Exotropia   Diagnosis Additional Information: No value filed.    Anesthesia Type:  General, LMA    Note:  Anesthesia Post Evaluation    Patient location during evaluation: PACU  Patient participation: Able to fully participate in evaluation  Level of consciousness: awake and alert  Pain management: adequate  Airway patency: patent  Cardiovascular status: acceptable  Respiratory status: acceptable  Hydration status: acceptable  PONV: none     Anesthetic complications: None          Last vitals:  Vitals:    04/25/18 0915 04/25/18 0930 04/25/18 0945   BP: 115/62 104/65 103/65   Pulse:      Resp: 24 16 16   Temp: 37.1  C (98.8  F)  37.1  C (98.7  F)   SpO2: 100% 100% 96%         Electronically Signed By: Brian Woo MD  April 25, 2018  10:03 AM

## 2018-04-25 NOTE — IP AVS SNAPSHOT
MRN:[id number]                      After Visit Summary   4/25/2018    Bailey Amin    MRN: [id number]           Thank you!     Thank you for choosing [facility] for your care. Our goal is always to provide you with excellent care. Hearing back from our patients is one way we can continue to improve our services. Please take a few minutes to complete the written survey that you may receive in the mail after you visit with us. Thank you!        Patient Information     Date Of Birth          1970        About your hospital stay     You were admitted on:  April 25, 2018 You last received care in the:  [facility] PACU    You were discharged on:  April 25, 2018       Who to Call     For medical emergencies, please call 911.  For non-urgent questions about your medical care, please call your primary care provider or clinic, [phone]  For questions related to your surgery, please call your surgery clinic        Attending Provider     Provider Marianna Harrison MD Ophthalmology       Primary Care Provider Office Phone # Fax #    Alexis De Luna [phone]      Your next 10 appointments already scheduled     May 03, 2018  9:20 AM CDT   Post-Op with Marianna Narayan MD   [facility] Peds Eye General ([facility])    [address] [address] [phone]              Further instructions from your care team       Instructions for after your eye surgery:       Apply ice packs to eyes on and off as tolerated for 2 days.    Acetaminophen (Tylenol) and NSAIDs (Motrin, Ibuprofen, Advil, Naproxen) may be given per the dosing instructions on the label for pain every 6 hours.  I recommend alternating these two types of medicine every 3 hours so that Bailey receives one of them for pain control every 3 hours.  (For example: acetaminophen - wait 3 hours - ibuprofen - wait 3 hours - acetaminophen - wait 3 hours - ibuprofen -  etc.)    Avoid all eye pressure or trauma. No eye rubbing, straining, or athletics for 1 week.     No water in the face (including bathing) for 1 week. Instill your antibiotic eye drops after bathing for the first week. No swimming for 2 weeks.      Return for follow-up as scheduled.  If you do not have an appointment already, please call to arrange follow-up in 1-2 weeks.    [place]: Leeann Goodman at [phone] or our  at [phone]    [place]: [phone]    If Bailey Amin experiences worsening RSVP (Redness, Sensitivity to light, Vision, Pain), or if Bailey develops a fever (temperature greater than 100.4 F) or worsening discharge or if you have any other concerns:      call [phone] (during business hours) or [phone] (after hours & weekends) and ask to speak with the Ophthalmology Resident or Fellow On-Call   OR    return to the eye clinic or emergency room immediately.     If Bailey is unable to tolerate food and drink, vomits 3 times, or appears to have decreased alertness or lethargy, return to the emergency room immediately as these can be signs of delayed stomach wake-up after anesthesia and Bailey may need IV fluids to prevent dehydration.    For assistance from an :    7 AM - 6 PM on Monday - Friday, and 7 AM - 4:30 PM on Saturday & Sunday: call [phone], then select option 3.    After hours: call [phone] and ask the  for  assistance.         Same-Day Surgery   Adult Discharge Orders & Instructions     For 24 hours after surgery:  1. Get plenty of rest.  A responsible adult must stay with you for at least 24 hours after you leave the hospital.   2. Pain medication can slow your reflexes. Do not drive or use heavy equipment.  If you have weakness or tingling, don't drive or use heavy equipment until this feeling goes away.  3. Mixing alcohol and pain medication can cause dizziness and slow your breathing. It can even be fatal.  Do not drink alcohol while taking pain medication.  4. Avoid strenuous or risky activities.  Ask for help when climbing stairs.   5. You may feel lightheaded.  If so, sit for a few minutes before standing.  Have someone help you get up.   6. If you have nausea (feel sick to your stomach), drink only clear liquids such as apple juice, ginger ale, broth or 7-Up.  Rest may also help.  Be sure to drink enough fluids.  Move to a regular diet as you feel able. Take pain medications with a small amount of solid food, such as toast or crackers, to avoid nausea.   7. A slight fever is normal. Call the doctor if your fever is over 100 F (37.7 C) (taken under the tongue) or lasts longer than 24 hours.  8. You may have a dry mouth, muscle aches, trouble sleeping or a sore throat.  These symptoms should go away after 24 hours.  9. Do not make important or legal decisions.   Pain Management:      1. Take pain medication (if prescribed) for pain as directed by your physician.        2. WARNING: If the pain medication you have been prescribed contains Tylenol  (acetaminophen), DO NOT take additional doses of Tylenol (acetaminophen).     Call your doctor for any of the followin.  Signs of infection (fever, growing tenderness at the surgery site, severe pain, a large amount of drainage or bleeding, foul-smelling drainage, redness, swelling).    2.  It has been over 8 to 10 hours since surgery and you are still not able to urinate (pee).    3.  Headache for over 24 hours.    4.  Numbness, tingling or weakness the day after surgery (if you had spinal anesthesia).  To contact a doctor, call [phone] or:      [phone] and ask for the Resident On Call for: OPTHALMOLOGY (answered 24 hours a day)      Emergency Department:  [place] [facility]: [phone]  [place] [facility]: [phone]               Rev. 10/2014     Strabismus Repair Discharge Instructions    Dr. Marianna Narayan, Dr. Triplett  Areaux      Your eye doctor performed surgery to help align your eye(s). During surgery, certain eye muscles are adjusted. This helps the muscles better control how the eye moves. Often, surgery is done in addition to other treatments.   How Surgery Works  Strabismus surgery is a safe, common operation. The doctor changes the placement or length of an eye muscle. This small change can pull the eye into proper alignment. The two most common methods of surgery are:    Recession, in which a muscle is moved to a new position on the eye.    Resection, in which a small section of an eye muscle is removed.  What to Expect  It is normal to experience the following:    Eye Redness. This will resolve slowly over 2- 4 weeks.    Pink tinged tears    Swollen, painful or itchy eyes    Sensitivity to bright lights.    Trouble opening eyes for 1-2 days.    Feeling dizzy or off balance until you get used to seeing differently.  After Surgery Care    Avoid rubbing your eyes.  o You may use cool cloths to help with pain and/or itching.    Minimize direct contact with water for 1 week. Showering or bathing is allowed.  o You may use a warm, wet washcloth to remove any dried drainage from the eye. Wipe eye from inner corner to the outer corner of the eye.     No swimming for 1 week.    Use sunglasses or a hat to protect eyes from bright lights if you are light sensitive.    You may wear glasses as soon as needed.    No heavy lifting or contact sports for 1 week.     Use eye drops or eye ointment as directed to prevent infection and decrease swelling. Avoid touching surface of eye with the tube or bottle.   Suture Care  Sutures will dissolve over several weeks; they will not need to be removed.   Adjustable sutures may have been placed during surgery. You may need to stay in the recovery room for a longer period after surgery before a possible suture adjustment is performed. Once the suture is adjusted you will be sent home.   When to  "Call the Doctor     Eyelids are progressively getting more swollen and painful after 24 hours.    You see a dramatic change in the position of the eye over time.    Frequent or continuous vomiting.    Green or yellow drainage from the eye.    Temperature over 101 degrees.  If your child experiences worsening RSVP (Redness, Sensitivity to light, Vision, Pain), or develops fever or worsening discharge, call EITHER    [phone] (8am-4:30pm Monday-Friday)    [phone] (after hours & weekends)  and ask to speak with the Ophthalmology Resident or Fellow On-Call or return to the eye clinic or emergency room immediately.        If your child is unable to tolerate food and drink, vomits 3 times, or appears to have decreased alertness or lethargy, return to the emergency room immediately. These can be signs of delayed gastrointestinal wake-up after anesthesia and your child may need IV fluids to prevent dehydration.    Follow Up  Follow up with your doctor as scheduled  Rev. 3/2014          Pending Results     No orders found from 4/23/2018 to 4/26/2018.            Admission Information     Date & Time Provider Department Dept. Phone    4/25/2018 Marianna Narayan MD [facility] PACU [phone]      Your Vitals Were     Blood Pressure Pulse Temperature Respirations Height Weight    115/62 86 98.8  F (37.1  C) (Oral) 24 1.641 m (5' 4.6\") 72.5 kg (159 lb 13.3 oz)    Pulse Oximetry BMI (Body Mass Index)                100% 26.93 kg/m2          [email or web address] Information     [employer] lets you send messages to your doctor, view your test results, renew your prescriptions, schedule appointments and more. To sign up, go to [email or web address] . Click on \"Log in\" on the left side of the screen, which will take you to the Welcome page. Then click on \"Sign up Now\" on the right side of the page.     You will be asked to enter the access code listed below, as well as some personal information. Please follow the directions to " create your username and password.     Your access code is: [email or web address]  Expires: 2018 10:53 AM     Your access code will  in 90 days. If you need help or a new code, please call your [facility] clinic or [phone].        Care EveryWhere ID     This is your Care EveryWhere ID. This could be used by other organizations to access your [facility] medical records  [id number]        Equal Access to Services     [facility] : Hadii aad ku hadasho Soomaali, waaxda luqadaha, qaybta kaalmada adeegyada, waxay idiin hayaan adeeg khisaacsh lapina . So [facility] [phone].    ATENCIÓN: Si habla español, tiene a [place] disposición servicios gratuitos de asistencia lingüística. Llame al [phone].    We comply with applicable federal civil rights laws and [place] laws. We do not discriminate on the basis of race, color, national origin, age, disability, sex, sexual orientation, or gender identity.               Review of your medicines      UNREVIEWED medicines. Ask your doctor about these medicines        Dose / Directions    cetirizine 10 MG tablet   Commonly known as:  zyrTEC        Dose:  10 mg   Take 10 mg by mouth daily   Refills:  0       cyclobenzaprine 10 MG tablet   Commonly known as:  FLEXERIL        Dose:  10 mg   Take 10 mg by mouth   Refills:  0       hydrOXYzine 25 MG capsule   Commonly known as:  VISTARIL        Dose:  25 mg   Take 25 mg by mouth   Refills:  0       mometasone 50 MCG/ACT spray   Commonly known as:  NASONEX        Dose:  2 spray   2 sprays   Refills:  0       MULTI-VITAMINS Tabs        Refills:  0       naproxen 500 MG tablet   Commonly known as:  NAPROSYN        TK 1 T PO BID   Refills:  0       triamcinolone 55 MCG/ACT Inhaler   Commonly known as:  NASACORT        Dose:  2 spray   Spray 2 sprays in nostril   Refills:  0         START taking        Dose / Directions    oxyCODONE IR 5 MG tablet   Commonly known as:  ROXICODONE   Used for:  Intermittent exotropia, monocular, Surgical  aftercare, sense organs        Dose:  5 mg   Take 1 tablet (5 mg) by mouth every 6 hours as needed for severe pain   Quantity:  25 tablet   Refills:  0            Where to get your medicines      Some of these will need a paper prescription and others can be bought over the counter. Ask your nurse if you have questions.     Bring a paper prescription for each of these medications     oxyCODONE IR 5 MG tablet                Protect others around you: Learn how to safely use, store and throw away your medicines at www.disposemymeds.org.        Information about OPIOIDS     PRESCRIPTION OPIOIDS: WHAT YOU NEED TO KNOW   You have a prescription for an opioid (narcotic) pain medicine. Opioids can cause addiction. If you have a history of chemical dependency of any type, you are at a higher risk of becoming addicted to opioids. Only take this medicine after all other options have been tried. Take it for as short a time and as few doses as possible.     Do not:    Drive. If you drive while taking these medicines, you could be arrested for driving under the influence (DUI).    Operate heavy machinery    Do any other dangerous activities while taking these medicines.     Drink any alcohol while taking these medicines.      Take with any other medicines that contain acetaminophen. Read all labels carefully. Look for the word  acetaminophen  or  Tylenol.  Ask your pharmacist if you have questions or are unsure.    Store your pills in a secure place, locked if possible. We will not replace any lost or stolen medicine. If you don t finish your medicine, please throw away (dispose) as directed by your pharmacist. The [place] Pollution Control Agency has more information about safe disposal: https://www.pca.[facility].mn.us/living-green/managing-unwanted-medications    All opioids tend to cause constipation. Drink plenty of water and eat foods that have a lot of fiber, such as fruits, vegetables, prune juice, apple juice and  high-fiber cereal. Take a laxative (Miralax, milk of magnesia, Colace, Senna) if you don t move your bowels at least every other day.              Medication List: This is a list of all your medications and when to take them. Check marks below indicate your daily home schedule. Keep this list as a reference.      Medications           Morning Afternoon Evening Bedtime As Needed    cetirizine 10 MG tablet   Commonly known as:  zyrTEC   Take 10 mg by mouth daily                                cyclobenzaprine 10 MG tablet   Commonly known as:  FLEXERIL   Take 10 mg by mouth                                hydrOXYzine 25 MG capsule   Commonly known as:  VISTARIL   Take 25 mg by mouth                                mometasone 50 MCG/ACT spray   Commonly known as:  NASONEX   2 sprays                                MULTI-VITAMINS Tabs                                naproxen 500 MG tablet   Commonly known as:  NAPROSYN   TK 1 T PO BID                                oxyCODONE IR 5 MG tablet   Commonly known as:  ROXICODONE   Take 1 tablet (5 mg) by mouth every 6 hours as needed for severe pain                                triamcinolone 55 MCG/ACT Inhaler   Commonly known as:  NASACORT   Spray 2 sprays in nostril

## 2018-04-27 DIAGNOSIS — Z98.890 POSTOPERATIVE EYE STATE: Primary | ICD-10-CM

## 2018-04-27 RX ORDER — NEOMYCIN SULFATE, POLYMYXIN B SULFATE AND DEXAMETHASONE 3.5; 10000; 1 MG/ML; [USP'U]/ML; MG/ML
1 SUSPENSION/ DROPS OPHTHALMIC 4 TIMES DAILY
Qty: 1 ML | Refills: 0 | Status: SHIPPED | OUTPATIENT
Start: 2018-04-27 | End: 2018-05-03

## 2018-04-30 NOTE — OP NOTE
Procedure Date: 04/25/2018      PREOPERATIVE  DIAGNOSES:   1.  Large angle intermittent exotropia.   2.  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   3.  Intermittent diplopia.      POSTOPERATIVE DIAGNOSES:     1.  Large angle intermittent exotropia.   2.  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    3.  Intermittent diplopia.      PROCEDURES:   1. Forced duction testing.   2. Bimedial rectus resection of 6.5 mm.   3. Celestone.      SURGEON:  Marianna Narayan MD.      FIRST ASSISTANT:   Dr. Yevgeniy Correa.      ANESTHESIA:  General.      ESTIMATED BLOOD LOSS:  1 mL.      COMPLICATIONS:  None.      INDICATIONS FOR PROCEDURE:  Mrs. Amin is a 48-year-old woman with an eye history as noted above.  The risks, benefits, alternatives to strabismus repair to restore binocular alignment were discussed including but not limited to infection, bleeding, loss of vision, over or under correction of her alignment, poor cosmesis and need for further surgery or worsening diplopia.  She elected to proceed.      DETAILS OF PROCEDURE:  After informed consent was obtained, Mrs. Amin was taken to the operating room where general anesthesia was induced without complication.  She was prepped and draped in the usual sterile fashion.  A timeout was performed.  Forced duction testing was performed.  There was minimal to no restriction to adduction in either eye.  The lid speculum was removed from the left eye and an occluder was placed.  Traction sutures were placed at 6 and 12  o' clock of the right eye and placed abducted position.  A nasal conjunctival peritomy was performed.  The infranasal supranasal quadrants were dissected.  The right medial rectus muscle was hooked using small and then [place]enport hooks.  The tenons were cleaned posteriorly from the muscle belly.  A caliper was used to measure 6.5 mm posterior to the original insertion.  This was marked with a marking pen.  A  6-0 double-arm Vicryl suture was used to imbricate the muscle at this maría using central and peripheral locking bites.  A straight clamp was placed anterior to the sutures  and the muscle was transected anterior to the clamp.  Cautery was used at the clamp edge and the clamp was removed.  Remaining muscle stump was excised from the globe.  Cautery was used for hemostasis.  Scleral passes were performed at the original insertion and the muscle was tied down.  8-0 Vicryl sutures was used to repair the conjunctiva.  Lid speculum and traction sutures were removed from the right eye.  Attention was then turned to the left eye where an identical procedure was performed.  The lid speculum and traction sutures were removed.  Subconjunctival injections of Celestone were given to both eyes.  Tobradex ointment was placed in both eyes.  Mrs. Carranza tolerated the procedure well and went to recovery room in stable condition.  She will follow up on postoperative week #1 in the Eye Clinic.         RADHA LIN MD             D: 2018   T: 2018   MT: MIKE      Name:     BRITTANY CARRANZA   MRN:      [id number]        Account:        [id number]   :      1970           Procedure Date: 2018      Document: [id number]

## 2018-05-03 ENCOUNTER — OFFICE VISIT (OUTPATIENT)
Dept: OPHTHALMOLOGY | Facility: CLINIC | Age: 48
End: 2018-05-03
Attending: OPHTHALMOLOGY
Payer: COMMERCIAL

## 2018-05-03 DIAGNOSIS — H50.05 ALTERNATING ESOTROPIA: Primary | ICD-10-CM

## 2018-05-03 PROCEDURE — G0463 HOSPITAL OUTPT CLINIC VISIT: HCPCS | Mod: 25,ZF | Performed by: TECHNICIAN/TECHNOLOGIST

## 2018-05-03 PROCEDURE — 92060 SENSORIMOTOR EXAMINATION: CPT | Mod: ZF | Performed by: OPHTHALMOLOGY

## 2018-05-03 ASSESSMENT — VISUAL ACUITY
METHOD: SNELLEN - LINEAR
OS_CC: 20/20
OD_CC+: -1
OD_CC: 20/20

## 2018-05-03 ASSESSMENT — REFRACTION_WEARINGRX
OS_ADD: +1.50
OD_AXIS: 180
OS_CYLINDER: +0.75
SPECS_TYPE: SVL
OD_CYLINDER: +0.75
OD_ADD: +1.50
OD_SPHERE: -3.50
OS_AXIS: 020
OS_SPHERE: -4.00

## 2018-05-03 ASSESSMENT — SLIT LAMP EXAM - LIDS
COMMENTS: NORMAL
COMMENTS: NORMAL

## 2018-05-03 NOTE — MR AVS SNAPSHOT
After Visit Summary   5/3/2018    Bailey Amin    MRN: [id number]           Patient Information     Date Of Birth          1970        Visit Information        Provider Department      5/3/2018 9:20 AM Marianna Narayan MD [facility] Peds Eye General        Today's Diagnoses     Alternating esotropia    -  1       Follow-ups after your visit        Follow-up notes from your care team     Return in about 4 weeks (around 2018).      Your next 10 appointments already scheduled     May 31, 2018  8:40 AM CDT   Return Pediatric Visit with Marianna Narayan MD   [facility] Peds Eye General ([facility])    [address]  [facility] [address] [phone]              Who to contact     Please call your clinic at [phone] to:    Ask questions about your health    Make or cancel appointments    Discuss your medicines    Learn about your test results    Speak to your doctor            Additional Information About Your Visit        MyChart Information     [employer] is an electronic gateway that provides easy, online access to your medical records. With [employer], you can request a clinic appointment, read your test results, renew a prescription or communicate with your care team.     To sign up for [employer] visit the website at [email or web address]   You will be asked to enter the access code listed below, as well as some personal information. Please follow the directions to create your username and password.     Your access code is: [email or web address]  Expires: 2018 10:53 AM     Your access code will  in 90 days. If you need help or a new code, please contact your [facility] or call [phone] for assistance.        Care EveryWhere ID     This is your Care EveryWhere ID. This could be used by other organizations to access your [facility] medical records  [id number]         Blood Pressure from Last 3 Encounters:   18  104/63   11/15/17 95/65    Weight from Last 3 Encounters:   04/25/18 72.5 kg (159 lb 13.3 oz)   11/15/17 54.6 kg (120 lb 5.9 oz)              We Performed the Following     Sensorimotor        Primary Care Provider Office Phone # Fax #    Alexis De Luna [phone]       [place] [address]        Equal Access to Services     SAHARA CLARK : Hadii aad ku hadasho Soomaali, waaxda luqadaha, qaybta kaalmada adeegyada, waxay idiin hayaan adekylie doddisaactj lapina . So [facility] [phone].    ATENCIÓN: Si juna whiteside, tiene a [place] disposición servicios gratuitos de asistencia lingüística. Shayame al [phone].    We comply with applicable federal civil rights laws and [place] laws. We do not discriminate on the basis of race, color, national origin, age, disability, sex, sexual orientation, or gender identity.            Thank you!     Thank you for choosing [facility] EYE GENERAL  for your care. Our goal is always to provide you with excellent care. Hearing back from our patients is one way we can continue to improve our services. Please take a few minutes to complete the written survey that you may receive in the mail after your visit with us. Thank you!             Your Updated Medication List - Protect others around you: Learn how to safely use, store and throw away your medicines at www.disposemymeds.org.          This list is accurate as of 5/3/18 10:26 AM.  Always use your most recent med list.                   Brand Name Dispense Instructions for use Diagnosis    cetirizine 10 MG tablet    zyrTEC     Take 10 mg by mouth daily        cyclobenzaprine 10 MG tablet    FLEXERIL     Take 10 mg by mouth        hydrOXYzine 25 MG capsule    VISTARIL     Take 25 mg by mouth        mometasone 50 MCG/ACT spray    NASONEX     2 sprays        MULTI-VITAMINS Tabs           naproxen 500 MG tablet    NAPROSYN     TK 1 T PO BID        neomycin-polymyxin-dexamethasone  [id number] Susp ophthalmic susp    MAXITROL    1 mL    Place 1 drop into both eyes 4 times daily for 5 days    Postoperative eye state       oxyCODONE IR 5 MG tablet    ROXICODONE    25 tablet    Take 1 tablet (5 mg) by mouth every 6 hours as needed for severe pain    Intermittent exotropia, monocular, Surgical aftercare, sense organs       triamcinolone 55 MCG/ACT Inhaler    NASACORT     Spray 2 sprays in nostril

## 2018-05-03 NOTE — PROGRESS NOTES
"Chief Complaints and History of Present Illnesses   Patient presents with     Post Op (Ophthalmology) Both Eyes     s/p bilateral lateral rectus recession of 10.0 mm and 9.5 mm. maxitrol drop 4x daily, AT prn, + horizontal diplopia worse than after previous surgery, occasionally notes vertical diplopia, diplopia comes and goes, worse at distance, frequently closing an eye and patching, patching makes her dizzy does better while sitting, eyes do not feel like they are moving, eyes appear straight    Review of systems for the eyes was negative other than the pertinent positives and negatives noted in the HPI.  History is obtained from the patient    Referring provider: Cady Lin     Primary care: Alexis De Luna   Assessment   Bailey Amin is a 48 year old female who presents with:       ICD-10-CM    1. Alternating esotropia H50.05 Sensorimotor         Plan  Bailey is 1 week s/p BMRs 6.5 millimeters for residual exotropia.  Bailey has small esotropia at distance but is fusing at near and with chin up at distance. Would expect diplopia to resolve in next 2-4 weeks.  F/u 4 weeks.     Further details of the management plan can be found in the \"Patient Instructions\" section which was printed and given to the patient at checkout.  Data Unavailable   Attending Physician Attestation:  Complete documentation of historical and exam elements from today's encounter can be found in the full encounter summary report (not reduplicated in this progress note).  I personally obtained the chief complaint(s) and history of present illness.  I confirmed and edited as necessary the review of systems, past medical/surgical history, family history, social history, and examination findings as documented by others; and I examined the patient myself.  I personally reviewed the relevant tests, images, and reports as documented above.  I formulated and edited as necessary the assessment and plan and discussed the findings and management " plan with the patient and family. - Marianna Narayan MD 5/3/2018 10:17 AM

## 2018-05-03 NOTE — NURSING NOTE
Chief Complaint   Patient presents with     Post Op (Ophthalmology) Both Eyes     s/p bilateral lateral rectus recession of 10.0 mm and 9.5 mm. maxitrol drop 4x daily, AT prn, + horizontal diplopia worse than after previous surgery, occasionally notes vertical diplopia, diplopia comes and goes, worse at distance, frequently closing an eye and patching, patching makes her dizzy does better while sitting, eyes do not feel like they are moving, eyes appear straight

## 2018-05-31 ENCOUNTER — OFFICE VISIT (OUTPATIENT)
Dept: OPHTHALMOLOGY | Facility: CLINIC | Age: 48
End: 2018-05-31
Attending: OPHTHALMOLOGY
Payer: COMMERCIAL

## 2018-05-31 DIAGNOSIS — H50.30 INTERMITTENT EXOTROPIA, MONOCULAR: Primary | ICD-10-CM

## 2018-05-31 PROCEDURE — G0463 HOSPITAL OUTPT CLINIC VISIT: HCPCS | Mod: 25,ZF | Performed by: TECHNICIAN/TECHNOLOGIST

## 2018-05-31 PROCEDURE — 92060 SENSORIMOTOR EXAMINATION: CPT | Mod: ZF | Performed by: OPHTHALMOLOGY

## 2018-05-31 ASSESSMENT — CONF VISUAL FIELD
OD_NORMAL: 1
OS_NORMAL: 1

## 2018-05-31 ASSESSMENT — SLIT LAMP EXAM - LIDS
COMMENTS: NORMAL
COMMENTS: NORMAL

## 2018-05-31 ASSESSMENT — VISUAL ACUITY
METHOD: SNELLEN - LINEAR
OD_CC: 20/20
OS_CC: 20/15

## 2018-05-31 ASSESSMENT — REFRACTION_WEARINGRX
OD_ADD: +1.50
OD_SPHERE: -3.50
OD_CYLINDER: +0.75
OS_CYLINDER: +0.75
OD_AXIS: 180
OS_AXIS: 020
OS_ADD: +1.50
SPECS_TYPE: SVL
OS_SPHERE: -4.00

## 2018-05-31 NOTE — PROGRESS NOTES
"Chief Complaints and History of Present Illnesses   Patient presents with     Esotropia Follow Up     S/p BLR 4/25 10.0 mm and 9.5 mm. No double vision in central gaze. Has some tightness. Some double if she looks to extreme gazes. RET noticed constantly. Notes some redness, no pain or discharge.   Review of systems for the eyes was negative other than the pertinent positives and negatives noted in the HPI.  History is obtained from the patient    Referring provider: aMrianna Narayan     Primary care: Alexis De Luna   Assessment   Bailey Amin is a 48 year old female who presents with:       ICD-10-CM    1. Intermittent exotropia, monocular H50.30 Sensorimotor         Plan  Ms. Amin is doing well with good alignment.  She has some blurring at near at times but feels she is still healing.  F/u 4 months, if blurring, consider MR and bifocal check.       Further details of the management plan can be found in the \"Patient Instructions\" section which was printed and given to the patient at checkout.  Return in about 4 months (around 9/30/2018).   Attending Physician Attestation:  Complete documentation of historical and exam elements from today's encounter can be found in the full encounter summary report (not reduplicated in this progress note).  I personally obtained the chief complaint(s) and history of present illness.  I confirmed and edited as necessary the review of systems, past medical/surgical history, family history, social history, and examination findings as documented by others; and I examined the patient myself.  I personally reviewed the relevant tests, images, and reports as documented above.  I formulated and edited as necessary the assessment and plan and discussed the findings and management plan with the patient and family. - Marianna Narayan MD 5/31/2018 9:36 AM       "

## 2018-05-31 NOTE — MR AVS SNAPSHOT
After Visit Summary   5/31/2018    Bailey Amin    MRN: [id number]           Patient Information     Date Of Birth          1970        Visit Information        Provider Department      5/31/2018 8:40 AM Marianna Narayan MD [facility]        Today's Diagnoses     Intermittent exotropia, monocular    -  1       Follow-ups after your visit        Follow-up notes from your care team     Return in about 4 months (around 9/30/2018).      Who to contact     Please call your clinic at [phone] to:    Ask questions about your health    Make or cancel appointments    Discuss your medicines    Learn about your test results    Speak to your doctor            Additional Information About Your Visit        Care EveryWhere ID     This is your Care EveryWhere ID. This could be used by other organizations to access your [place] medical records  [id number]         Blood Pressure from Last 3 Encounters:   04/25/18 104/63   11/15/17 95/65    Weight from Last 3 Encounters:   04/25/18 72.5 kg (159 lb 13.3 oz)   11/15/17 54.6 kg (120 lb 5.9 oz)              We Performed the Following     Sensorimotor        Primary Care Provider Office Phone # Fax #    Alexis De Luna [phone]       [place] [address]        Equal Access to Services     SAHARA CLARK AH: Hadii aad ku hadasho Soomaali, waaxda luqadaha, qaybta kaalmada adeegyada, katrina peraza. So [facility] [phone].    ATENCIÓN: Si habla español, tiene a [place] disposición servicios gratuitos de asistencia lingüística. Llame al [phone].    We comply with applicable federal civil rights laws and [place] laws. We do not discriminate on the basis of race, color, national origin, age, disability, sex, sexual orientation, or gender identity.            Thank you!     Thank you for choosing [facility]  for your care. Our goal is always to provide you  with excellent care. Hearing back from our patients is one way we can continue to improve our services. Please take a few minutes to complete the written survey that you may receive in the mail after your visit with us. Thank you!             Your Updated Medication List - Protect others around you: Learn how to safely use, store and throw away your medicines at www.disposemymeds.org.          This list is accurate as of 5/31/18  9:39 AM.  Always use your most recent med list.                   Brand Name Dispense Instructions for use Diagnosis    cetirizine 10 MG tablet    zyrTEC     Take 10 mg by mouth daily        cyclobenzaprine 10 MG tablet    FLEXERIL     Take 10 mg by mouth        hydrOXYzine 25 MG capsule    VISTARIL     Take 25 mg by mouth        mometasone 50 MCG/ACT spray    NASONEX     2 sprays        MULTI-VITAMINS Tabs           naproxen 500 MG tablet    NAPROSYN     TK 1 T PO BID        oxyCODONE IR 5 MG tablet    ROXICODONE    25 tablet    Take 1 tablet (5 mg) by mouth every 6 hours as needed for severe pain    Intermittent exotropia, monocular, Surgical aftercare, sense organs       triamcinolone 55 MCG/ACT Inhaler    NASACORT     Spray 2 sprays in nostril

## 2018-05-31 NOTE — NURSING NOTE
Chief Complaint   Patient presents with     Esotropia Follow Up     S/p BLR 4/25 10.0 mm and 9.5 mm. No double vision in central gaze. Has some tightness. Some double if she looks to extreme gazes. RET noticed constantly. Notes some redness, no pain or discharge.     HPI    Informant(s):  Patient   Symptoms:

## 2018-09-27 ENCOUNTER — OFFICE VISIT (OUTPATIENT)
Dept: OPHTHALMOLOGY | Facility: CLINIC | Age: 48
End: 2018-09-27
Attending: OPHTHALMOLOGY
Payer: COMMERCIAL

## 2018-09-27 DIAGNOSIS — H50.34 INTERMITTENT EXOTROPIA, ALTERNATING: Primary | ICD-10-CM

## 2018-09-27 PROCEDURE — 92060 SENSORIMOTOR EXAMINATION: CPT | Mod: ZF | Performed by: OPHTHALMOLOGY

## 2018-09-27 PROCEDURE — G0463 HOSPITAL OUTPT CLINIC VISIT: HCPCS | Mod: 25,ZF | Performed by: TECHNICIAN/TECHNOLOGIST

## 2018-09-27 ASSESSMENT — REFRACTION_WEARINGRX
OD_AXIS: 180
OD_CYLINDER: +0.75
OS_AXIS: 015
OS_ADD: +1.50
OS_CYLINDER: +1.00
OD_SPHERE: -3.50
OD_ADD: +1.50
OS_SPHERE: -4.25
SPECS_TYPE: PAL

## 2018-09-27 ASSESSMENT — CONF VISUAL FIELD
METHOD: COUNTING FINGERS
OS_NORMAL: 1
OD_NORMAL: 1

## 2018-09-27 ASSESSMENT — VISUAL ACUITY
OD_CC: 20/20
METHOD: SNELLEN - LINEAR
OS_CC: 20/20
CORRECTION_TYPE: GLASSES
OS_CC+: -1

## 2018-09-27 ASSESSMENT — EXTERNAL EXAM - LEFT EYE: OS_EXAM: NORMAL

## 2018-09-27 ASSESSMENT — SLIT LAMP EXAM - LIDS
COMMENTS: NORMAL
COMMENTS: NORMAL

## 2018-09-27 ASSESSMENT — EXTERNAL EXAM - RIGHT EYE: OD_EXAM: NORMAL

## 2018-09-27 NOTE — PROGRESS NOTES
"Chief Complaints and History of Present Illnesses   Patient presents with     Exotropia Follow Up     No double except for far right gaze. VA seems good. She can feel her right eye drifting outwards on rare occasion, usually when she is fatigued. But she can quickly pull it back into alignment. notes some difficulty focusing from near to distance    Review of systems for the eyes was negative other than the pertinent positives and negatives noted in the HPI.  History is obtained from the patient    Referring provider: Marianna Narayan    Primary care: Alexis De Luna   Assessment   Bailey Amin is a 48 year old female who presents with:       ICD-10-CM    1. Intermittent exotropia, alternating H50.34 Sensorimotor         Plan  Bailey is doing well with great alignment and vision.  Bailey prefers f/u in 1 year or sooner PRN.       Further details of the management plan can be found in the \"Patient Instructions\" section which was printed and given to the patient at checkout.  Return in about 1 year (around 9/27/2019).   Attending Physician Attestation:  Complete documentation of historical and exam elements from today's encounter can be found in the full encounter summary report (not reduplicated in this progress note).  I personally obtained the chief complaint(s) and history of present illness.  I confirmed and edited as necessary the review of systems, past medical/surgical history, family history, social history, and examination findings as documented by others; and I examined the patient myself.  I personally reviewed the relevant tests, images, and reports as documented above.  I formulated and edited as necessary the assessment and plan and discussed the findings and management plan with the patient and family. - Marianna Narayan MD 9/27/2018 10:05 AM        "

## 2018-09-27 NOTE — MR AVS SNAPSHOT
After Visit Summary   9/27/2018    Bailey Amin    MRN: [id number]           Patient Information     Date Of Birth          1970        Visit Information        Provider Department      9/27/2018 8:40 AM Marianna Narayan MD [facility]        Today's Diagnoses     Intermittent exotropia, alternating    -  1       Follow-ups after your visit        Follow-up notes from your care team     Return in about 1 year (around 9/27/2019).      Who to contact     Please call your clinic at [phone] to:    Ask questions about your health    Make or cancel appointments    Discuss your medicines    Learn about your test results    Speak to your doctor            Additional Information About Your Visit        Care EveryWhere ID     This is your Care EveryWhere ID. This could be used by other organizations to access your [place] medical records  [id number]         Blood Pressure from Last 3 Encounters:   04/25/18 104/63   11/15/17 95/65    Weight from Last 3 Encounters:   04/25/18 72.5 kg (159 lb 13.3 oz)   11/15/17 54.6 kg (120 lb 5.9 oz)              We Performed the Following     Sensorimotor        Primary Care Provider Office Phone # Fax #    Alexis De Luna [phone]       [place] [address]        Equal Access to Services     SAHARA CLARK : Hadii aad ku hadasho Soomaali, waaxda luqadaha, qaybta kaalmada adeegyada, katrina peraza. So [facility] [phone].    ATENCIÓN: Si habla español, tiene a [place] disposición servicios gratuitos de asistencia lingüística. Llame al [phone].    We comply with applicable federal civil rights laws and [place] laws. We do not discriminate on the basis of race, color, national origin, age, disability, sex, sexual orientation, or gender identity.            Thank you!     Thank you for choosing [facility]  for your care. Our goal is always to provide you  with excellent care. Hearing back from our patients is one way we can continue to improve our services. Please take a few minutes to complete the written survey that you may receive in the mail after your visit with us. Thank you!             Your Updated Medication List - Protect others around you: Learn how to safely use, store and throw away your medicines at www.disposemymeds.org.          This list is accurate as of 9/27/18 10:06 AM.  Always use your most recent med list.                   Brand Name Dispense Instructions for use Diagnosis    cetirizine 10 MG tablet    zyrTEC     Take 10 mg by mouth daily        cyclobenzaprine 10 MG tablet    FLEXERIL     Take 10 mg by mouth        hydrOXYzine 25 MG capsule    VISTARIL     Take 25 mg by mouth        mometasone 50 MCG/ACT spray    NASONEX     2 sprays        MULTI-VITAMINS Tabs           naproxen 500 MG tablet    NAPROSYN     TK 1 T PO BID        oxyCODONE IR 5 MG tablet    ROXICODONE    25 tablet    Take 1 tablet (5 mg) by mouth every 6 hours as needed for severe pain    Intermittent exotropia, monocular, Surgical aftercare, sense organs       triamcinolone 55 MCG/ACT inhaler    NASACORT     Spray 2 sprays in nostril

## 2019-06-05 ENCOUNTER — THERAPY VISIT (OUTPATIENT)
Dept: PHYSICAL THERAPY | Facility: CLINIC | Age: 49
End: 2019-06-05
Payer: COMMERCIAL

## 2019-06-05 DIAGNOSIS — M54.9 BACK PAIN: Primary | ICD-10-CM

## 2019-06-05 PROCEDURE — 97530 THERAPEUTIC ACTIVITIES: CPT | Mod: GP | Performed by: PHYSICAL THERAPIST

## 2019-06-05 PROCEDURE — 97161 PT EVAL LOW COMPLEX 20 MIN: CPT | Mod: GP | Performed by: PHYSICAL THERAPIST

## 2019-06-05 NOTE — PROGRESS NOTES
[facility] Initial Evaluation  Subjective:  Pt presents to PT with c/o LBP with insidious onset 1 year ago.  Pt reports she has been self treating with massage and chiropractic.  She has also been working on stretches.      Pt works as an analyst.      PMH: R lateral hip strain over a year ago, R carpal tunnel syndrome      Bailey Amin is a 49 year old female with a lumbar condition.           Patient reports pain:  Central lumbar spine (left sided low back pain).  Radiates to: none.  Pain is described as aching  and reported as 2/10.  Associated symptoms:  Loss of motion/stiffness. Pain is worse in the A.M..  Exacerbated by: going down stairs, sitting, twisting. and relieved by rest and activity/movement.  Since onset symptoms are gradually worsening.  Special testing: none.  Previous treatment includes chiropractic.  There was mild improvement following previous treatment.  General health as reported by patient is good.                                              Objective:  System         Lumbar/SI Evaluation  ROM:    AROM Lumbar:   Flexion:          100%, ++  Ext:                    100%, +   Side Bend:        Left:     Right:   Rotation:           Left:  75%, +    Right:  75%, +  Side Glide:        Left:     Right:         Strength: deconditioning core stabilizers  Lumbar Myotomes:  normal            Lumbar DTR's:  not assessed      Cord Signs:  not assessed    Lumbar Dermtomes:  normal                  Lumbar Palpation:  Palpation (lumbar): TTP R greater troch, TTP lumbar SP L3-5 with hypertrophy.      Functional Tests:  Core strength and proprioception lumbar: Squat pain in lumbar.  SLS mild unsteadiness on L.  SL Squat moderate usnteadines on L.                                                             General Evaluation:                        Posture:  Posture wnl general: sig anterior pelvic tilt.          Gait:  Gait wnl general: Pt amb without AD with increased pelvic  rotation.                                         ROS    Assessment/Plan:    Patient is a 49 year old female with thoracic and lumbar complaints.    Patient has the following significant findings with corresponding treatment plan.                Diagnosis 1:  LBP  Pain -  hot/cold therapy  Decreased ROM/flexibility - manual therapy and therapeutic exercise  Decreased joint mobility - manual therapy and therapeutic exercise  Decreased strength - therapeutic exercise and therapeutic activities  Impaired balance - neuro re-education and therapeutic activities  Decreased proprioception - neuro re-education and therapeutic activities  Impaired muscle performance - neuro re-education  Decreased function - therapeutic activities  Impaired posture - neuro re-education    Therapy Evaluation Codes:   1) History comprised of:   Personal factors that impact the plan of care:      None.    Comorbidity factors that impact the plan of care are:      None.     Medications impacting care: None.  2) Examination of Body Systems comprised of:   Body structures and functions that impact the plan of care:      Lumbar spine.   Activity limitations that impact the plan of care are:      Bending.  3) Clinical presentation characteristics are:   Stable/Uncomplicated.  4) Decision-Making    Low complexity using standardized patient assessment instrument and/or measureable assessment of functional outcome.  Cumulative Therapy Evaluation is: Low complexity.    Previous and current functional limitations:  (See Goal Flow Sheet for this information)    Short term and Long term goals: (See Goal Flow Sheet for this information)     Communication ability:  Patient appears to be able to clearly communicate and understand verbal and written communication and follow directions correctly.  Treatment Explanation - The following has been discussed with the patient:   RX ordered/plan of care  Anticipated outcomes  Possible risks and side effects  This  patient would benefit from PT intervention to resume normal activities.   Rehab potential is good.    Frequency:  1 X week, once daily  Duration:  for 6 weeks  Discharge Plan:  Achieve all LTG.  Independent in home treatment program.  Return to previous functional level by discharge.  Reach maximal therapeutic benefit.    Please refer to the daily flowsheet for treatment today, total treatment time and time spent performing 1:1 timed codes.

## 2019-06-12 ENCOUNTER — THERAPY VISIT (OUTPATIENT)
Dept: PHYSICAL THERAPY | Facility: CLINIC | Age: 49
End: 2019-06-12
Payer: COMMERCIAL

## 2019-06-12 DIAGNOSIS — G89.29 CHRONIC BILATERAL THORACIC BACK PAIN: Primary | ICD-10-CM

## 2019-06-12 DIAGNOSIS — M54.6 CHRONIC BILATERAL THORACIC BACK PAIN: Primary | ICD-10-CM

## 2019-06-12 PROCEDURE — 97112 NEUROMUSCULAR REEDUCATION: CPT | Mod: GP | Performed by: PHYSICAL THERAPIST

## 2019-06-12 PROCEDURE — 97110 THERAPEUTIC EXERCISES: CPT | Mod: GP | Performed by: PHYSICAL THERAPIST

## 2019-06-12 PROCEDURE — 97530 THERAPEUTIC ACTIVITIES: CPT | Mod: GP | Performed by: PHYSICAL THERAPIST

## 2019-06-19 ENCOUNTER — THERAPY VISIT (OUTPATIENT)
Dept: PHYSICAL THERAPY | Facility: CLINIC | Age: 49
End: 2019-06-19
Payer: COMMERCIAL

## 2019-06-19 DIAGNOSIS — G89.29 CHRONIC BILATERAL THORACIC BACK PAIN: Primary | ICD-10-CM

## 2019-06-19 DIAGNOSIS — M54.6 CHRONIC BILATERAL THORACIC BACK PAIN: Primary | ICD-10-CM

## 2019-06-19 PROCEDURE — 97110 THERAPEUTIC EXERCISES: CPT | Mod: GP | Performed by: PHYSICAL THERAPIST

## 2019-08-28 NOTE — PROGRESS NOTES
"Chief Complaints and History of Present Illnesses   Patient presents with     Post Op (Ophthalmology) Both Eyes     some irritation and pain, but improveing. Rare diplpoia in far left gaze only. Doesn't like ointment, blurs vision, would like to change to a drop   Review of systems for the eyes was negative other than the pertinent positives and negatives noted in the HPI.  History is obtained from the patient    Referring provider: Cady Lin     Primary care: Alexis De Luna   Assessment   Bailey Amin is a 47 year old female who presents with:       ICD-10-CM    1. Postsurgical state, eye Z98.890 tobramycin (TOBREX) 0.3 % ophthalmic solution   2. Intermittent exotropia, monocular H50.30          Plan  Ms Amin is doing great on POD #2 s/p BLRc 10.0/9.5.  She requests drops instead of ointment as she has to return to work.  F/u 3 months.       Further details of the management plan can be found in the \"Patient Instructions\" section which was printed and given to the patient at checkout.  Return in about 3 months (around 2/17/2018).   Attending Physician Attestation:  Complete documentation of historical and exam elements from today's encounter can be found in the full encounter summary report (not reduplicated in this progress note).  I personally obtained the chief complaint(s) and history of present illness.  I confirmed and edited as necessary the review of systems, past medical/surgical history, family history, social history, and examination findings as documented by others; and I examined the patient myself.  I personally reviewed the relevant tests, images, and reports as documented above.  I formulated and edited as necessary the assessment and plan and discussed the findings and management plan with the patient and family. - Marianna Narayan MD 11/17/2017 1:43 PM       " AOX4, uses call light and able to make needs known. CGA with walker. Continent of B&B and uses the BR, bladder urgency, please answer call light promptly. BP elevated at beginning of shift and down to normal with meds. Oral suctioning PRN. Scalp incision stutured, no sign of infection, open to air.  Denies pain or SOB, pleasant and cooperative with cares. Continue with POC

## 2020-01-20 NOTE — PROGRESS NOTES
Discharge Note    Progress reporting period is from initial evaluation date Jun 5  to Jun 19, 2019.      Bailey failed to follow up and current status is unknown.  Please see information below for last relevant information on current status.  Patient seen for 3 visits.    SUBJECTIVE  Subjective changes noted by patient:  Pt reports she feels 75% better.  .  Current pain level is  .     Previous pain level was   .   Changes in function:  Yes (See Goal flowsheet attached for changes in current functional level)  Adverse reaction to treatment or activity: None    OBJECTIVE  Changes noted in objective findings: Improved pelvic control     ASSESSMENT/PLAN  Diagnosis: LBP   Updated problem list and treatment plan:   Pain - HEP  Decreased ROM/flexibility - HEP  Decreased function - HEP  Decreased strength - HEP  Impaired muscle performance - HEP  Impaired balance - HEP  Decreased proprioception - HEP  Impaired posture - HEP  Decreased joint mobility - HEP  STG/LTGs have been met or progress has been made towards goals:  Yes, please see goal flowsheet for most current information  Assessment of Progress: current status is unknown.    Last current status:     Self Management Plans:  HEP  I have re-evaluated this patient and find that the nature, scope, duration and intensity of the therapy is appropriate for the medical condition of the patient.  Bailey continues to require the following intervention to meet STG and LTG's:  HEP.    Recommendations:  Discharge with current home program.  Patient to follow up with MD as needed.    Please refer to the daily flowsheet for treatment today, total treatment time and time spent performing 1:1 timed codes.

## 2022-09-30 NOTE — NURSING NOTE
Chief Complaint   Patient presents with     Exotropia Follow Up     Notes blur at distance, feels like she lets her eye drift to avoid the blur and then her eye drifts out. Gls are 1.5 yrs old. No diplopia, no pain, no redness.        
No

## 2023-06-30 ENCOUNTER — LAB REQUISITION (OUTPATIENT)
Dept: LAB | Facility: CLINIC | Age: 53
End: 2023-06-30
Payer: COMMERCIAL

## 2023-06-30 DIAGNOSIS — Z00.00 ENCOUNTER FOR GENERAL ADULT MEDICAL EXAMINATION WITHOUT ABNORMAL FINDINGS: ICD-10-CM

## 2023-06-30 LAB
ALBUMIN SERPL BCG-MCNC: 4.3 G/DL (ref 3.5–5.2)
ALP SERPL-CCNC: 90 U/L (ref 35–104)
ALT SERPL W P-5'-P-CCNC: 10 U/L (ref 0–50)
ANION GAP SERPL CALCULATED.3IONS-SCNC: 12 MMOL/L (ref 7–15)
AST SERPL W P-5'-P-CCNC: 23 U/L (ref 0–45)
BILIRUB SERPL-MCNC: 0.4 MG/DL
BUN SERPL-MCNC: 14.2 MG/DL (ref 6–20)
CALCIUM SERPL-MCNC: 9.8 MG/DL (ref 8.6–10)
CHLORIDE SERPL-SCNC: 107 MMOL/L (ref 98–107)
CHOLEST SERPL-MCNC: 247 MG/DL
CREAT SERPL-MCNC: 0.85 MG/DL (ref 0.51–0.95)
DEPRECATED HCO3 PLAS-SCNC: 25 MMOL/L (ref 22–29)
GFR SERPL CREATININE-BSD FRML MDRD: 81 ML/MIN/1.73M2
GLUCOSE SERPL-MCNC: 102 MG/DL (ref 70–99)
HBA1C MFR BLD: 6.6 %
HDLC SERPL-MCNC: 71 MG/DL
HOLD SPECIMEN: NORMAL
LDLC SERPL CALC-MCNC: 166 MG/DL
NONHDLC SERPL-MCNC: 176 MG/DL
POTASSIUM SERPL-SCNC: 4.4 MMOL/L (ref 3.4–5.3)
PROT SERPL-MCNC: 7.7 G/DL (ref 6.4–8.3)
SODIUM SERPL-SCNC: 144 MMOL/L (ref 136–145)
TRIGL SERPL-MCNC: 48 MG/DL
TSH SERPL DL<=0.005 MIU/L-ACNC: 1.37 UIU/ML (ref 0.3–4.2)

## 2023-06-30 PROCEDURE — 84443 ASSAY THYROID STIM HORMONE: CPT | Mod: ORL | Performed by: OBSTETRICS & GYNECOLOGY

## 2023-06-30 PROCEDURE — 80061 LIPID PANEL: CPT | Mod: ORL | Performed by: OBSTETRICS & GYNECOLOGY

## 2023-06-30 PROCEDURE — 80053 COMPREHEN METABOLIC PANEL: CPT | Mod: ORL | Performed by: OBSTETRICS & GYNECOLOGY

## 2023-06-30 PROCEDURE — 83036 HEMOGLOBIN GLYCOSYLATED A1C: CPT | Mod: ORL | Performed by: OBSTETRICS & GYNECOLOGY

## 2024-07-12 ENCOUNTER — LAB REQUISITION (OUTPATIENT)
Dept: LAB | Facility: CLINIC | Age: 54
End: 2024-07-12
Payer: COMMERCIAL

## 2024-07-12 DIAGNOSIS — Z01.419 ENCOUNTER FOR GYNECOLOGICAL EXAMINATION (GENERAL) (ROUTINE) WITHOUT ABNORMAL FINDINGS: ICD-10-CM

## 2024-07-12 DIAGNOSIS — Z12.4 ENCOUNTER FOR SCREENING FOR MALIGNANT NEOPLASM OF CERVIX: ICD-10-CM

## 2024-07-12 LAB
CHOLEST SERPL-MCNC: 271 MG/DL
FASTING STATUS PATIENT QL REPORTED: YES
HBA1C MFR BLD: 6.4 %
HDLC SERPL-MCNC: 80 MG/DL
LDLC SERPL CALC-MCNC: 176 MG/DL
NONHDLC SERPL-MCNC: 191 MG/DL
TRIGL SERPL-MCNC: 76 MG/DL
TSH SERPL DL<=0.005 MIU/L-ACNC: 1.29 UIU/ML (ref 0.3–4.2)

## 2024-07-12 PROCEDURE — 84443 ASSAY THYROID STIM HORMONE: CPT | Mod: ORL | Performed by: OBSTETRICS & GYNECOLOGY

## 2024-07-12 PROCEDURE — 87624 HPV HI-RISK TYP POOLED RSLT: CPT | Mod: ORL | Performed by: OBSTETRICS & GYNECOLOGY

## 2024-07-12 PROCEDURE — 80061 LIPID PANEL: CPT | Mod: ORL | Performed by: OBSTETRICS & GYNECOLOGY

## 2024-07-12 PROCEDURE — 83036 HEMOGLOBIN GLYCOSYLATED A1C: CPT | Mod: ORL | Performed by: OBSTETRICS & GYNECOLOGY

## 2024-07-12 PROCEDURE — G0145 SCR C/V CYTO,THINLAYER,RESCR: HCPCS | Mod: ORL | Performed by: OBSTETRICS & GYNECOLOGY

## 2024-07-15 LAB
HPV HR 12 DNA CVX QL NAA+PROBE: NEGATIVE
HPV16 DNA CVX QL NAA+PROBE: NEGATIVE
HPV18 DNA CVX QL NAA+PROBE: NEGATIVE
HUMAN PAPILLOMA VIRUS FINAL DIAGNOSIS: NORMAL

## 2024-07-17 LAB
BKR LAB AP GYN ADEQUACY: NORMAL
BKR LAB AP GYN INTERPRETATION: NORMAL
PATH REPORT.COMMENTS IMP SPEC: NORMAL
PATH REPORT.COMMENTS IMP SPEC: NORMAL

## 2024-09-05 ENCOUNTER — OFFICE VISIT (OUTPATIENT)
Dept: OPHTHALMOLOGY | Facility: CLINIC | Age: 54
End: 2024-09-05
Attending: OPHTHALMOLOGY
Payer: COMMERCIAL

## 2024-09-05 DIAGNOSIS — H50.30 INTERMITTENT EXOTROPIA, MONOCULAR: Primary | ICD-10-CM

## 2024-09-05 DIAGNOSIS — H04.123 DRY EYE SYNDROME OF BOTH EYES: ICD-10-CM

## 2024-09-05 PROCEDURE — 92002 INTRM OPH EXAM NEW PATIENT: CPT | Performed by: OPHTHALMOLOGY

## 2024-09-05 PROCEDURE — 99214 OFFICE O/P EST MOD 30 MIN: CPT | Performed by: OPHTHALMOLOGY

## 2024-09-05 PROCEDURE — 92060 SENSORIMOTOR EXAMINATION: CPT | Performed by: OPHTHALMOLOGY

## 2024-09-05 RX ORDER — CYCLOSPORINE 0.5 MG/ML
1 EMULSION OPHTHALMIC 2 TIMES DAILY
Qty: 5.5 ML | Refills: 5 | Status: SHIPPED | OUTPATIENT
Start: 2024-09-05 | End: 2024-12-04

## 2024-09-05 RX ORDER — DUPILUMAB 300 MG/2ML
INJECTION, SOLUTION SUBCUTANEOUS
COMMUNITY

## 2024-09-05 ASSESSMENT — CONF VISUAL FIELD
OS_INFERIOR_NASAL_RESTRICTION: 0
OD_INFERIOR_NASAL_RESTRICTION: 0
OD_INFERIOR_TEMPORAL_RESTRICTION: 0
OS_SUPERIOR_NASAL_RESTRICTION: 0
OD_NORMAL: 1
OS_SUPERIOR_TEMPORAL_RESTRICTION: 0
OS_INFERIOR_TEMPORAL_RESTRICTION: 0
OD_SUPERIOR_NASAL_RESTRICTION: 0
OD_SUPERIOR_TEMPORAL_RESTRICTION: 0
OS_NORMAL: 1

## 2024-09-05 ASSESSMENT — REFRACTION_WEARINGRX
OD_AXIS: 175
OS_ADD: +2.00
OS_CYLINDER: +1.00
OD_ADD: +2.00
OD_SPHERE: -5.00
OD_CYLINDER: +1.00
OS_AXIS: 025
OS_SPHERE: -5.00

## 2024-09-05 ASSESSMENT — VISUAL ACUITY
OS_CC: 20/20
METHOD: SNELLEN - LINEAR
OD_CC: 20/20

## 2024-09-05 ASSESSMENT — TONOMETRY
IOP_METHOD: ICARE
OS_IOP_MMHG: 20
OD_IOP_MMHG: 20

## 2024-09-05 NOTE — NURSING NOTE
Chief Complaint(s) and History of Present Illness(es)       Exotropia Follow Up              Laterality: both eyes    Associated symptoms: Negative for eye pain    Treatments tried: surgery              Comments    Bilateral rectus recession of 10.0 mm and 9.5 mm 11/2017  Bimedial rectus resection of 6.5 mm - 4/2018    Started dupixent infusions every 2 weeks, started to notice dry eyes and diplopia. The images were like a shadow, superimposed but blurry. Using AT, increased number of drops and it is better. Eyes feel fatigued.  Last eye exam was in December 2023.  The diplopia in the right gaze has been present since the surgery, but it is not bothersome.

## 2024-09-08 ASSESSMENT — SLIT LAMP EXAM - LIDS
COMMENTS: NORMAL
COMMENTS: NORMAL

## 2024-09-08 ASSESSMENT — EXTERNAL EXAM - RIGHT EYE: OD_EXAM: NORMAL

## 2024-09-08 ASSESSMENT — EXTERNAL EXAM - LEFT EYE: OS_EXAM: NORMAL

## 2024-09-09 NOTE — PROGRESS NOTES
"Chief Complaints and History of Present Illnesses   Patient presents with    Exotropia Follow Up   Review of systems for the eyes was negative other than the pertinent positives and negatives noted in the HPI.  History is obtained from the patient    Referring provider: Referred Self     Primary care: Alexis De Luna   Assessment   Bailey Amin is a 54 year old female who presents with:       ICD-10-CM    1. Intermittent exotropia, monocular  H50.30 Sensorimotor     cycloSPORINE (RESTASIS) 0.05 % ophthalmic emulsion      2. Dry eye syndrome of both eyes  H04.123             Shantanu Avalos has stable, excellent alignment but is having some blurred vision from dry eye despite multiple PFAT per day.  She is on a medication that is contributing to dry eye.  Will give prescription for Restasis BID BE.  Follow-up 1 year and PRN.       Further details of the management plan can be found in the \"Patient Instructions\" section which was printed and given to the patient at checkout.  No follow-ups on file.   Attending Physician Attestation:  Complete documentation of historical and exam elements from today's encounter can be found in the full encounter summary report (not reduplicated in this progress note).  I personally obtained the chief complaint(s) and history of present illness.  I confirmed and edited as necessary the review of systems, past medical/surgical history, family history, social history, and examination findings as documented by others; and I examined the patient myself.  I personally reviewed the relevant tests, images, and reports as documented above.  I formulated and edited as necessary the assessment and plan and discussed the findings and management plan with the patient and family. - Marianna Narayan MD 9/8/2024 11:14 PM          " No

## 2025-08-20 ENCOUNTER — PATIENT OUTREACH (OUTPATIENT)
Dept: CARE COORDINATION | Facility: CLINIC | Age: 55
End: 2025-08-20
Payer: COMMERCIAL

## (undated) DEVICE — SU VICRYL 8-0 TG140-8DA 12" J548G

## (undated) DEVICE — EYE MARKING PAD 581057

## (undated) DEVICE — GLOVE PROTEXIS MICRO 6.5  2D73PM65

## (undated) DEVICE — COVER CAMERA IN-LIGHT DISP LT-C02

## (undated) DEVICE — PAD ARMBOARD FOAM EGGCRATE COVIDEN 3114367

## (undated) DEVICE — NDL 30GA 0.5" 305106

## (undated) DEVICE — PACK MINOR EYE

## (undated) DEVICE — ESU EYE LOW TEMP 65410-010

## (undated) DEVICE — SOL WATER IRRIG 1000ML BOTTLE 2F7114

## (undated) DEVICE — SYR 01ML 27GA 0.5" NDL TBC 309623

## (undated) DEVICE — LINEN TOWEL PACK X5 5464

## (undated) DEVICE — EYE PREP BETADINE 5% SOLUTION 30ML 0065-0411-30

## (undated) DEVICE — STRAP KNEE/BODY 31143004

## (undated) DEVICE — SU VICRYL 6-0 S-29 12" J556G

## (undated) DEVICE — SU SILK 5-0 TF 18" N266H

## (undated) DEVICE — NDL 19GA 1.5"

## (undated) RX ORDER — DEXAMETHASONE SODIUM PHOSPHATE 4 MG/ML
INJECTION, SOLUTION INTRA-ARTICULAR; INTRALESIONAL; INTRAMUSCULAR; INTRAVENOUS; SOFT TISSUE
Status: DISPENSED
Start: 2017-11-15

## (undated) RX ORDER — KETOROLAC TROMETHAMINE 30 MG/ML
INJECTION, SOLUTION INTRAMUSCULAR; INTRAVENOUS
Status: DISPENSED
Start: 2017-11-15

## (undated) RX ORDER — GLYCOPYRROLATE 0.2 MG/ML
INJECTION, SOLUTION INTRAMUSCULAR; INTRAVENOUS
Status: DISPENSED
Start: 2017-11-15

## (undated) RX ORDER — SCOLOPAMINE TRANSDERMAL SYSTEM 1 MG/1
PATCH, EXTENDED RELEASE TRANSDERMAL
Status: DISPENSED
Start: 2017-11-15

## (undated) RX ORDER — ONDANSETRON 2 MG/ML
INJECTION INTRAMUSCULAR; INTRAVENOUS
Status: DISPENSED
Start: 2017-11-15

## (undated) RX ORDER — BETAMETHASONE SODIUM PHOSPHATE AND BETAMETHASONE ACETATE 3; 3 MG/ML; MG/ML
INJECTION, SUSPENSION INTRA-ARTICULAR; INTRALESIONAL; INTRAMUSCULAR; SOFT TISSUE
Status: DISPENSED
Start: 2018-04-25

## (undated) RX ORDER — GLYCOPYRROLATE 0.2 MG/ML
INJECTION, SOLUTION INTRAMUSCULAR; INTRAVENOUS
Status: DISPENSED
Start: 2018-04-25

## (undated) RX ORDER — OXYCODONE HYDROCHLORIDE 5 MG/1
TABLET ORAL
Status: DISPENSED
Start: 2018-04-25

## (undated) RX ORDER — ONDANSETRON 2 MG/ML
INJECTION INTRAMUSCULAR; INTRAVENOUS
Status: DISPENSED
Start: 2018-04-25

## (undated) RX ORDER — HYDROMORPHONE HYDROCHLORIDE 1 MG/ML
INJECTION, SOLUTION INTRAMUSCULAR; INTRAVENOUS; SUBCUTANEOUS
Status: DISPENSED
Start: 2017-11-15

## (undated) RX ORDER — LIDOCAINE HYDROCHLORIDE 20 MG/ML
INJECTION, SOLUTION EPIDURAL; INFILTRATION; INTRACAUDAL; PERINEURAL
Status: DISPENSED
Start: 2018-04-25

## (undated) RX ORDER — EPHEDRINE SULFATE 50 MG/ML
INJECTION, SOLUTION INTRAMUSCULAR; INTRAVENOUS; SUBCUTANEOUS
Status: DISPENSED
Start: 2018-04-25

## (undated) RX ORDER — FENTANYL CITRATE 50 UG/ML
INJECTION, SOLUTION INTRAMUSCULAR; INTRAVENOUS
Status: DISPENSED
Start: 2018-04-25

## (undated) RX ORDER — PROPOFOL 10 MG/ML
INJECTION, EMULSION INTRAVENOUS
Status: DISPENSED
Start: 2017-11-15

## (undated) RX ORDER — LIDOCAINE HYDROCHLORIDE 20 MG/ML
INJECTION, SOLUTION EPIDURAL; INFILTRATION; INTRACAUDAL; PERINEURAL
Status: DISPENSED
Start: 2017-11-15

## (undated) RX ORDER — FENTANYL CITRATE 50 UG/ML
INJECTION, SOLUTION INTRAMUSCULAR; INTRAVENOUS
Status: DISPENSED
Start: 2017-11-15

## (undated) RX ORDER — PROPOFOL 10 MG/ML
INJECTION, EMULSION INTRAVENOUS
Status: DISPENSED
Start: 2018-04-25

## (undated) RX ORDER — ACETAMINOPHEN 500 MG
TABLET ORAL
Status: DISPENSED
Start: 2017-11-15

## (undated) RX ORDER — KETOROLAC TROMETHAMINE 30 MG/ML
INJECTION, SOLUTION INTRAMUSCULAR; INTRAVENOUS
Status: DISPENSED
Start: 2018-04-25

## (undated) RX ORDER — SCOLOPAMINE TRANSDERMAL SYSTEM 1 MG/1
PATCH, EXTENDED RELEASE TRANSDERMAL
Status: DISPENSED
Start: 2018-04-25

## (undated) RX ORDER — DEXAMETHASONE SODIUM PHOSPHATE 4 MG/ML
INJECTION, SOLUTION INTRA-ARTICULAR; INTRALESIONAL; INTRAMUSCULAR; INTRAVENOUS; SOFT TISSUE
Status: DISPENSED
Start: 2018-04-25

## (undated) RX ORDER — OXYCODONE HYDROCHLORIDE 5 MG/1
TABLET ORAL
Status: DISPENSED
Start: 2017-11-15

## (undated) RX ORDER — HYDROMORPHONE HYDROCHLORIDE 1 MG/ML
INJECTION, SOLUTION INTRAMUSCULAR; INTRAVENOUS; SUBCUTANEOUS
Status: DISPENSED
Start: 2018-04-25